# Patient Record
Sex: MALE | Race: WHITE | NOT HISPANIC OR LATINO | Employment: FULL TIME | URBAN - METROPOLITAN AREA
[De-identification: names, ages, dates, MRNs, and addresses within clinical notes are randomized per-mention and may not be internally consistent; named-entity substitution may affect disease eponyms.]

---

## 2021-02-02 ENCOUNTER — APPOINTMENT (OUTPATIENT)
Dept: RADIOLOGY | Facility: MEDICAL CENTER | Age: 31
End: 2021-02-02
Attending: EMERGENCY MEDICINE
Payer: COMMERCIAL

## 2021-02-02 ENCOUNTER — HOSPITAL ENCOUNTER (OUTPATIENT)
Facility: MEDICAL CENTER | Age: 31
End: 2021-02-03
Attending: EMERGENCY MEDICINE | Admitting: HOSPITALIST
Payer: COMMERCIAL

## 2021-02-02 ENCOUNTER — APPOINTMENT (OUTPATIENT)
Dept: RADIOLOGY | Facility: MEDICAL CENTER | Age: 31
End: 2021-02-02
Attending: HOSPITALIST
Payer: COMMERCIAL

## 2021-02-02 DIAGNOSIS — R26.2 INABILITY TO AMBULATE DUE TO MULTIPLE JOINTS: ICD-10-CM

## 2021-02-02 DIAGNOSIS — M54.6 ACUTE BILATERAL THORACIC BACK PAIN: ICD-10-CM

## 2021-02-02 DIAGNOSIS — R10.13 ACUTE EPIGASTRIC PAIN: ICD-10-CM

## 2021-02-02 DIAGNOSIS — I10 ESSENTIAL HYPERTENSION: ICD-10-CM

## 2021-02-02 DIAGNOSIS — R07.81 PLEURITIC CHEST PAIN: ICD-10-CM

## 2021-02-02 DIAGNOSIS — R40.4 TRANSIENT ALTERATION OF AWARENESS: ICD-10-CM

## 2021-02-02 DIAGNOSIS — E86.0 DEHYDRATION: ICD-10-CM

## 2021-02-02 PROBLEM — R73.09 ELEVATED GLUCOSE: Status: ACTIVE | Noted: 2021-02-02

## 2021-02-02 PROBLEM — E87.20 LACTIC ACIDOSIS: Status: ACTIVE | Noted: 2021-02-02

## 2021-02-02 PROBLEM — M54.9 PAIN, UPPER BACK: Status: ACTIVE | Noted: 2021-02-02

## 2021-02-02 LAB
ALBUMIN SERPL BCP-MCNC: 4.6 G/DL (ref 3.2–4.9)
ALBUMIN/GLOB SERPL: 1.7 G/DL
ALP SERPL-CCNC: 78 U/L (ref 30–99)
ALT SERPL-CCNC: 52 U/L (ref 2–50)
ANION GAP SERPL CALC-SCNC: 14 MMOL/L (ref 7–16)
APPEARANCE UR: CLEAR
APTT PPP: 28.1 SEC (ref 24.7–36)
AST SERPL-CCNC: 42 U/L (ref 12–45)
BASOPHILS # BLD AUTO: 1.2 % (ref 0–1.8)
BASOPHILS # BLD: 0.05 K/UL (ref 0–0.12)
BILIRUB SERPL-MCNC: 0.5 MG/DL (ref 0.1–1.5)
BILIRUB UR QL STRIP.AUTO: NEGATIVE
BUN SERPL-MCNC: 10 MG/DL (ref 8–22)
CALCIUM SERPL-MCNC: 9.2 MG/DL (ref 8.4–10.2)
CHLORIDE SERPL-SCNC: 105 MMOL/L (ref 96–112)
CO2 SERPL-SCNC: 22 MMOL/L (ref 20–33)
COLOR UR: YELLOW
CREAT SERPL-MCNC: 0.89 MG/DL (ref 0.5–1.4)
EKG IMPRESSION: NORMAL
EOSINOPHIL # BLD AUTO: 0.1 K/UL (ref 0–0.51)
EOSINOPHIL NFR BLD: 2.5 % (ref 0–6.9)
ERYTHROCYTE [DISTWIDTH] IN BLOOD BY AUTOMATED COUNT: 39.8 FL (ref 35.9–50)
GLOBULIN SER CALC-MCNC: 2.7 G/DL (ref 1.9–3.5)
GLUCOSE SERPL-MCNC: 100 MG/DL (ref 65–99)
GLUCOSE UR STRIP.AUTO-MCNC: NEGATIVE MG/DL
HCT VFR BLD AUTO: 46.1 % (ref 42–52)
HGB BLD-MCNC: 16.3 G/DL (ref 14–18)
IMM GRANULOCYTES # BLD AUTO: 0.01 K/UL (ref 0–0.11)
IMM GRANULOCYTES NFR BLD AUTO: 0.2 % (ref 0–0.9)
INR PPP: 0.92 (ref 0.87–1.13)
KETONES UR STRIP.AUTO-MCNC: NEGATIVE MG/DL
LACTATE BLD-SCNC: 2.2 MMOL/L (ref 0.5–2)
LEUKOCYTE ESTERASE UR QL STRIP.AUTO: NEGATIVE
LIPASE SERPL-CCNC: 31 U/L (ref 7–58)
LYMPHOCYTES # BLD AUTO: 1.97 K/UL (ref 1–4.8)
LYMPHOCYTES NFR BLD: 48.6 % (ref 22–41)
MCH RBC QN AUTO: 33.6 PG (ref 27–33)
MCHC RBC AUTO-ENTMCNC: 35.4 G/DL (ref 33.7–35.3)
MCV RBC AUTO: 95.1 FL (ref 81.4–97.8)
MICRO URNS: NORMAL
MONOCYTES # BLD AUTO: 0.45 K/UL (ref 0–0.85)
MONOCYTES NFR BLD AUTO: 11.1 % (ref 0–13.4)
NEUTROPHILS # BLD AUTO: 1.47 K/UL (ref 1.82–7.42)
NEUTROPHILS NFR BLD: 36.4 % (ref 44–72)
NITRITE UR QL STRIP.AUTO: NEGATIVE
NRBC # BLD AUTO: 0 K/UL
NRBC BLD-RTO: 0 /100 WBC
NT-PROBNP SERPL IA-MCNC: <5 PG/ML (ref 0–125)
PH UR STRIP.AUTO: 6 [PH] (ref 5–8)
PLATELET # BLD AUTO: 214 K/UL (ref 164–446)
PMV BLD AUTO: 8.7 FL (ref 9–12.9)
POTASSIUM SERPL-SCNC: 4.1 MMOL/L (ref 3.6–5.5)
PROT SERPL-MCNC: 7.3 G/DL (ref 6–8.2)
PROT UR QL STRIP: NEGATIVE MG/DL
PROTHROMBIN TIME: 12.1 SEC (ref 12–14.6)
RBC # BLD AUTO: 4.85 M/UL (ref 4.7–6.1)
RBC UR QL AUTO: NEGATIVE
SODIUM SERPL-SCNC: 141 MMOL/L (ref 135–145)
SP GR UR STRIP.AUTO: 1.01
TROPONIN T SERPL-MCNC: <6 NG/L (ref 6–19)
WBC # BLD AUTO: 4.1 K/UL (ref 4.8–10.8)

## 2021-02-02 PROCEDURE — 700102 HCHG RX REV CODE 250 W/ 637 OVERRIDE(OP): Performed by: EMERGENCY MEDICINE

## 2021-02-02 PROCEDURE — 85025 COMPLETE CBC W/AUTO DIFF WBC: CPT

## 2021-02-02 PROCEDURE — 81003 URINALYSIS AUTO W/O SCOPE: CPT

## 2021-02-02 PROCEDURE — 700105 HCHG RX REV CODE 258: Performed by: EMERGENCY MEDICINE

## 2021-02-02 PROCEDURE — 80053 COMPREHEN METABOLIC PANEL: CPT

## 2021-02-02 PROCEDURE — 83605 ASSAY OF LACTIC ACID: CPT

## 2021-02-02 PROCEDURE — 93005 ELECTROCARDIOGRAM TRACING: CPT | Performed by: EMERGENCY MEDICINE

## 2021-02-02 PROCEDURE — 71275 CT ANGIOGRAPHY CHEST: CPT

## 2021-02-02 PROCEDURE — 700111 HCHG RX REV CODE 636 W/ 250 OVERRIDE (IP): Performed by: STUDENT IN AN ORGANIZED HEALTH CARE EDUCATION/TRAINING PROGRAM

## 2021-02-02 PROCEDURE — 85730 THROMBOPLASTIN TIME PARTIAL: CPT

## 2021-02-02 PROCEDURE — G0378 HOSPITAL OBSERVATION PER HR: HCPCS

## 2021-02-02 PROCEDURE — 700117 HCHG RX CONTRAST REV CODE 255: Performed by: HOSPITALIST

## 2021-02-02 PROCEDURE — A9270 NON-COVERED ITEM OR SERVICE: HCPCS | Performed by: EMERGENCY MEDICINE

## 2021-02-02 PROCEDURE — A9576 INJ PROHANCE MULTIPACK: HCPCS | Performed by: HOSPITALIST

## 2021-02-02 PROCEDURE — 72128 CT CHEST SPINE W/O DYE: CPT

## 2021-02-02 PROCEDURE — 700111 HCHG RX REV CODE 636 W/ 250 OVERRIDE (IP): Performed by: EMERGENCY MEDICINE

## 2021-02-02 PROCEDURE — 96376 TX/PRO/DX INJ SAME DRUG ADON: CPT

## 2021-02-02 PROCEDURE — 72157 MRI CHEST SPINE W/O & W/DYE: CPT

## 2021-02-02 PROCEDURE — 96374 THER/PROPH/DIAG INJ IV PUSH: CPT

## 2021-02-02 PROCEDURE — 700101 HCHG RX REV CODE 250: Performed by: STUDENT IN AN ORGANIZED HEALTH CARE EDUCATION/TRAINING PROGRAM

## 2021-02-02 PROCEDURE — 36415 COLL VENOUS BLD VENIPUNCTURE: CPT

## 2021-02-02 PROCEDURE — 70450 CT HEAD/BRAIN W/O DYE: CPT

## 2021-02-02 PROCEDURE — 84484 ASSAY OF TROPONIN QUANT: CPT

## 2021-02-02 PROCEDURE — 700117 HCHG RX CONTRAST REV CODE 255: Performed by: EMERGENCY MEDICINE

## 2021-02-02 PROCEDURE — 83880 ASSAY OF NATRIURETIC PEPTIDE: CPT

## 2021-02-02 PROCEDURE — 83690 ASSAY OF LIPASE: CPT

## 2021-02-02 PROCEDURE — 99285 EMERGENCY DEPT VISIT HI MDM: CPT

## 2021-02-02 PROCEDURE — 99218 PR INITIAL OBSERVATION CARE,LEVL I: CPT | Performed by: HOSPITALIST

## 2021-02-02 PROCEDURE — 85610 PROTHROMBIN TIME: CPT

## 2021-02-02 RX ORDER — POLYETHYLENE GLYCOL 3350 17 G/17G
1 POWDER, FOR SOLUTION ORAL
Status: DISCONTINUED | OUTPATIENT
Start: 2021-02-02 | End: 2021-02-03 | Stop reason: HOSPADM

## 2021-02-02 RX ORDER — ONDANSETRON 2 MG/ML
4 INJECTION INTRAMUSCULAR; INTRAVENOUS EVERY 4 HOURS PRN
Status: DISCONTINUED | OUTPATIENT
Start: 2021-02-02 | End: 2021-02-03 | Stop reason: HOSPADM

## 2021-02-02 RX ORDER — CYCLOBENZAPRINE HCL 10 MG
10 TABLET ORAL ONCE
Status: COMPLETED | OUTPATIENT
Start: 2021-02-02 | End: 2021-02-02

## 2021-02-02 RX ORDER — KETOROLAC TROMETHAMINE 30 MG/ML
15 INJECTION, SOLUTION INTRAMUSCULAR; INTRAVENOUS ONCE
Status: COMPLETED | OUTPATIENT
Start: 2021-02-02 | End: 2021-02-02

## 2021-02-02 RX ORDER — LIDOCAINE 50 MG/G
1 PATCH TOPICAL EVERY 24 HOURS
Status: DISCONTINUED | OUTPATIENT
Start: 2021-02-02 | End: 2021-02-03 | Stop reason: HOSPADM

## 2021-02-02 RX ORDER — AMOXICILLIN 250 MG
2 CAPSULE ORAL 2 TIMES DAILY
Status: DISCONTINUED | OUTPATIENT
Start: 2021-02-02 | End: 2021-02-03 | Stop reason: HOSPADM

## 2021-02-02 RX ORDER — ONDANSETRON 4 MG/1
4 TABLET, ORALLY DISINTEGRATING ORAL EVERY 4 HOURS PRN
Status: DISCONTINUED | OUTPATIENT
Start: 2021-02-02 | End: 2021-02-03 | Stop reason: HOSPADM

## 2021-02-02 RX ORDER — HYDROMORPHONE HYDROCHLORIDE 1 MG/ML
1 INJECTION, SOLUTION INTRAMUSCULAR; INTRAVENOUS; SUBCUTANEOUS ONCE
Status: ACTIVE | OUTPATIENT
Start: 2021-02-02 | End: 2021-02-03

## 2021-02-02 RX ORDER — PROMETHAZINE HYDROCHLORIDE 25 MG/1
12.5-25 SUPPOSITORY RECTAL EVERY 4 HOURS PRN
Status: DISCONTINUED | OUTPATIENT
Start: 2021-02-02 | End: 2021-02-03 | Stop reason: HOSPADM

## 2021-02-02 RX ORDER — MORPHINE SULFATE 4 MG/ML
2 INJECTION, SOLUTION INTRAMUSCULAR; INTRAVENOUS
Status: DISCONTINUED | OUTPATIENT
Start: 2021-02-02 | End: 2021-02-02

## 2021-02-02 RX ORDER — BISACODYL 10 MG
10 SUPPOSITORY, RECTAL RECTAL
Status: DISCONTINUED | OUTPATIENT
Start: 2021-02-02 | End: 2021-02-03 | Stop reason: HOSPADM

## 2021-02-02 RX ORDER — KETOROLAC TROMETHAMINE 30 MG/ML
30 INJECTION, SOLUTION INTRAMUSCULAR; INTRAVENOUS EVERY 6 HOURS PRN
Status: COMPLETED | OUTPATIENT
Start: 2021-02-02 | End: 2021-02-03

## 2021-02-02 RX ORDER — PROMETHAZINE HYDROCHLORIDE 25 MG/1
12.5-25 TABLET ORAL EVERY 4 HOURS PRN
Status: DISCONTINUED | OUTPATIENT
Start: 2021-02-02 | End: 2021-02-03 | Stop reason: HOSPADM

## 2021-02-02 RX ORDER — SODIUM CHLORIDE 9 MG/ML
1000 INJECTION, SOLUTION INTRAVENOUS ONCE
Status: COMPLETED | OUTPATIENT
Start: 2021-02-02 | End: 2021-02-02

## 2021-02-02 RX ORDER — ONDANSETRON 2 MG/ML
4 INJECTION INTRAMUSCULAR; INTRAVENOUS ONCE
Status: DISCONTINUED | OUTPATIENT
Start: 2021-02-02 | End: 2021-02-02

## 2021-02-02 RX ORDER — PROCHLORPERAZINE EDISYLATE 5 MG/ML
5-10 INJECTION INTRAMUSCULAR; INTRAVENOUS EVERY 4 HOURS PRN
Status: DISCONTINUED | OUTPATIENT
Start: 2021-02-02 | End: 2021-02-03 | Stop reason: HOSPADM

## 2021-02-02 RX ORDER — ACETAMINOPHEN 500 MG
1000 TABLET ORAL EVERY 6 HOURS PRN
COMMUNITY
End: 2023-04-24

## 2021-02-02 RX ORDER — ACETAMINOPHEN 325 MG/1
650 TABLET ORAL EVERY 6 HOURS PRN
Status: DISCONTINUED | OUTPATIENT
Start: 2021-02-02 | End: 2021-02-03 | Stop reason: HOSPADM

## 2021-02-02 RX ADMIN — IOHEXOL 100 ML: 350 INJECTION, SOLUTION INTRAVENOUS at 02:32

## 2021-02-02 RX ADMIN — CYCLOBENZAPRINE 10 MG: 10 TABLET, FILM COATED ORAL at 03:28

## 2021-02-02 RX ADMIN — LIDOCAINE 1 PATCH: 50 PATCH TOPICAL at 12:46

## 2021-02-02 RX ADMIN — KETOROLAC TROMETHAMINE 30 MG: 30 INJECTION, SOLUTION INTRAMUSCULAR; INTRAVENOUS at 12:42

## 2021-02-02 RX ADMIN — KETOROLAC TROMETHAMINE 15 MG: 30 INJECTION, SOLUTION INTRAMUSCULAR at 03:27

## 2021-02-02 RX ADMIN — GADOTERIDOL 20 ML: 279.3 INJECTION, SOLUTION INTRAVENOUS at 08:39

## 2021-02-02 RX ADMIN — SODIUM CHLORIDE 1000 ML: 9 INJECTION, SOLUTION INTRAVENOUS at 01:56

## 2021-02-02 SDOH — HEALTH STABILITY: MENTAL HEALTH: HOW OFTEN DO YOU HAVE A DRINK CONTAINING ALCOHOL?: 4 OR MORE TIMES A WEEK

## 2021-02-02 ASSESSMENT — ENCOUNTER SYMPTOMS
NECK PAIN: 0
WEAKNESS: 0
PALPITATIONS: 0
BACK PAIN: 1
INSOMNIA: 0
BRUISES/BLEEDS EASILY: 0
COUGH: 0
SHORTNESS OF BREATH: 0
SORE THROAT: 0
FEVER: 0
BLURRED VISION: 0
MYALGIAS: 0
HEADACHES: 0
VOMITING: 0
DIZZINESS: 0
DOUBLE VISION: 0
DEPRESSION: 0
NAUSEA: 0

## 2021-02-02 ASSESSMENT — COGNITIVE AND FUNCTIONAL STATUS - GENERAL
MOVING TO AND FROM BED TO CHAIR: A LITTLE
CLIMB 3 TO 5 STEPS WITH RAILING: A LITTLE
SUGGESTED CMS G CODE MODIFIER DAILY ACTIVITY: CI
SUGGESTED CMS G CODE MODIFIER MOBILITY: CK
WALKING IN HOSPITAL ROOM: A LITTLE
MOBILITY SCORE: 18
TURNING FROM BACK TO SIDE WHILE IN FLAT BAD: A LITTLE
MOVING FROM LYING ON BACK TO SITTING ON SIDE OF FLAT BED: A LITTLE
STANDING UP FROM CHAIR USING ARMS: A LITTLE
DAILY ACTIVITIY SCORE: 23
TOILETING: A LITTLE

## 2021-02-02 ASSESSMENT — PAIN DESCRIPTION - PAIN TYPE
TYPE: ACUTE PAIN

## 2021-02-02 ASSESSMENT — LIFESTYLE VARIABLES
HAVE YOU EVER FELT YOU SHOULD CUT DOWN ON YOUR DRINKING: NO
TOTAL SCORE: 0
ALCOHOL_USE: YES
TOTAL SCORE: 0
ON A TYPICAL DAY WHEN YOU DRINK ALCOHOL HOW MANY DRINKS DO YOU HAVE: 2
EVER FELT BAD OR GUILTY ABOUT YOUR DRINKING: NO
CONSUMPTION TOTAL: NEGATIVE
HOW MANY TIMES IN THE PAST YEAR HAVE YOU HAD 5 OR MORE DRINKS IN A DAY: 0
EVER HAD A DRINK FIRST THING IN THE MORNING TO STEADY YOUR NERVES TO GET RID OF A HANGOVER: NO
AVERAGE NUMBER OF DAYS PER WEEK YOU HAVE A DRINK CONTAINING ALCOHOL: 7
HAVE PEOPLE ANNOYED YOU BY CRITICIZING YOUR DRINKING: NO
TOTAL SCORE: 0

## 2021-02-02 ASSESSMENT — FIBROSIS 4 INDEX: FIB4 SCORE: 0.87

## 2021-02-02 ASSESSMENT — PATIENT HEALTH QUESTIONNAIRE - PHQ9
1. LITTLE INTEREST OR PLEASURE IN DOING THINGS: NOT AT ALL
2. FEELING DOWN, DEPRESSED, IRRITABLE, OR HOPELESS: NOT AT ALL
SUM OF ALL RESPONSES TO PHQ9 QUESTIONS 1 AND 2: 0

## 2021-02-02 NOTE — ED TRIAGE NOTES
Pt comes in with wife  Started this after noon w/ severe upper back pain  Has Hx of disc issues  When to the store and back pain started   Making it difficult for pt to walk or move   Then later today around 5-6 wife to pt to friends and pt does not remember how they got there, how was there or why they were there   Pt states to RN he does drink alcohol to help him sleep after he gets off of work    Pt does not have numbness or tingling  No loss of bowel or bladder

## 2021-02-02 NOTE — CARE PLAN
Problem: Pain Management  Goal: Pain level will decrease to patient's comfort goal  Outcome: PROGRESSING SLOWER THAN EXPECTED   Patient states his pain is 4/10 without movement which is tolerable. Patient is refusing narcotic pain medication and stated that the toradol helped a little, MD updated for order. Will continue to monitor for pain.     Problem: Knowledge Deficit  Goal: Knowledge of disease process/condition, treatment plan, diagnostic tests, and medications will improve  Outcome: PROGRESSING AS EXPECTED   Discussed plan of care with patient including MRI of the spine ordered and pain management. Allowed time for questions, patient agreed and verbalized understanding.

## 2021-02-02 NOTE — ED PROVIDER NOTES
ED Provider Note    ED Provider Note    Scribed for Raf Lizarraga MD by Raf Lizarraga M.D.. 2/2/2021, 12:58 AM.    Primary care provider: No primary care provider on file.  Means of arrival: Private  History obtained from: Patient and spouse  History limited by: None    CHIEF COMPLAINT  Chief Complaint   Patient presents with   • Back Pain     upper back pain started today  was fine in morning  went to story when on set pain (around noon)     • Memory Loss     per wife around 5-6 tongiht pt had a difficult time remembering where he was, who was there and how he got there        HPI  Adria Chacon is a 30 y.o. male who presents to the Emergency Department for evaluation of acute back pain.  Patient relates while he was shopping today, he notes he would not do any significant heavy lifting, only 2 small grocery bags with began to have initially milder than more severe pain to the upper mid back.  Patient localizes the pain essentially between the shoulder blades.  He does have a history of sciatica and lumbar degenerative disc disease but no known thoracic disease.  He notes the pain does radiate to the anterior abdomen and the bilateral costal margins.  Notes pain involves epigastrium as well.  Pain more so with deep respiration and any attempted movement especially bending his neck forward or backward.  He is never really had any similar such symptoms.  He took a gram of Tylenol prior to arrival no improvement.  Patient notes exquisite pain when he is attempting to move and is in obvious significant distress.  He notes pain does not seem to radiate down the legs, denies any bowel or bladder dysfunction, denies any involvement in the extremities, no numbness and no weakness.    Patient's wife also notes he seemed to have a difficult time acutely this evening remembering exactly where he was in the home and how he got in there.  He has no history of any cognitive issues, never really any similar  "such symptoms as this.  Patient is fully oriented at this time and is able to give a good history.  Denies any significant drug use but does occasionally use alcohol.  Is a former smoker, employed and otherwise healthy though he does not regularly follow with primary care provider he notes unremarkable work physicals on a yearly basis.    REVIEW OF SYSTEMS  Pertinent positives include acute atraumatic upper/mid back pain with radiation to the epigastrium and the costal margins with painful movement and deep respiration. Pertinent negatives include no bowel or bladder dysfunction, no trauma, no vomiting, no fever.  All other systems reviewed and negative.    PAST MEDICAL HISTORY   Otherwise healthy, former tobacco use    SURGICAL HISTORY   has a past surgical history that includes other and appendectomy.    SOCIAL HISTORY  Social History     Tobacco Use   • Smoking status: Former Smoker     Years: 3.00   • Smokeless tobacco: Never Used   Substance Use Topics   • Alcohol use: Yes     Frequency: 4 or more times a week   • Drug use: Never      Social History     Substance and Sexual Activity   Drug Use Never       FAMILY HISTORY  History reviewed. No pertinent family history.  Noncontributory    CURRENT MEDICATIONS  Home Medications     Reviewed by Helen Alvarez R.N. (Registered Nurse) on 02/02/21 at 0027  Med List Status: <None>   Medication Last Dose Status        Patient Gilberto Taking any Medications                       ALLERGIES  Not on File    PHYSICAL EXAM  VITAL SIGNS: /89   Pulse 77   Temp 36.5 °C (97.7 °F) (Temporal)   Resp 16   Ht 1.803 m (5' 11\")   Wt 104 kg (230 lb)   SpO2 94%   BMI 32.08 kg/m²     General: Alert, moderate acute distress, appears significantly uncomfortable.  Skin: Warm, dry, mildly pale  Head: Normocephalic, atraumatic  Neck: Trachea midline, no tenderness  Eye: PERRL, normal conjunctiva  ENMT: Oral mucosa moist, no pharyngeal erythema or exudate  Cardiovascular: Regular " rate and rhythm, No murmur, Normal peripheral perfusion  Respiratory: Lungs CTA, respirations are non-labored, breath sounds are equal  Gastrointestinal: Soft, tenderness the bilateral upper quadrant of the epigastrium, no guarding, no rebound, no rigidity.  Musculoskeletal: No swelling, no deformity.  Reproducible tenderness to both costal margins without any step-off.  Exquisite tenderness to the midline and paraspinous regions of the upper thoracic spine approximating T3 2 through T10.  Neurological: Alert and oriented to person, place, time, and situation.  5 x 5 dorsal plantarflexion of the feet and toes, no foot drop, no saddle anesthesia.  Lymphatics: No lymphadenopathy  Psychiatric: Cooperative, appropriate mood & affect      DIAGNOSTIC STUDIES/PROCEDURES    LABS  Results for orders placed or performed during the hospital encounter of 02/02/21   CBC w/ Differential   Result Value Ref Range    WBC 4.1 (L) 4.8 - 10.8 K/uL    RBC 4.85 4.70 - 6.10 M/uL    Hemoglobin 16.3 14.0 - 18.0 g/dL    Hematocrit 46.1 42.0 - 52.0 %    MCV 95.1 81.4 - 97.8 fL    MCH 33.6 (H) 27.0 - 33.0 pg    MCHC 35.4 (H) 33.7 - 35.3 g/dL    RDW 39.8 35.9 - 50.0 fL    Platelet Count 214 164 - 446 K/uL    MPV 8.7 (L) 9.0 - 12.9 fL    Neutrophils-Polys 36.40 (L) 44.00 - 72.00 %    Lymphocytes 48.60 (H) 22.00 - 41.00 %    Monocytes 11.10 0.00 - 13.40 %    Eosinophils 2.50 0.00 - 6.90 %    Basophils 1.20 0.00 - 1.80 %    Immature Granulocytes 0.20 0.00 - 0.90 %    Nucleated RBC 0.00 /100 WBC    Neutrophils (Absolute) 1.47 (L) 1.82 - 7.42 K/uL    Lymphs (Absolute) 1.97 1.00 - 4.80 K/uL    Monos (Absolute) 0.45 0.00 - 0.85 K/uL    Eos (Absolute) 0.10 0.00 - 0.51 K/uL    Baso (Absolute) 0.05 0.00 - 0.12 K/uL    Immature Granulocytes (abs) 0.01 0.00 - 0.11 K/uL    NRBC (Absolute) 0.00 K/uL   Complete Metabolic Panel (CMP)   Result Value Ref Range    Sodium 141 135 - 145 mmol/L    Potassium 4.1 3.6 - 5.5 mmol/L    Chloride 105 96 - 112 mmol/L    Co2  22 20 - 33 mmol/L    Anion Gap 14.0 7.0 - 16.0    Glucose 100 (H) 65 - 99 mg/dL    Bun 10 8 - 22 mg/dL    Creatinine 0.89 0.50 - 1.40 mg/dL    Calcium 9.2 8.4 - 10.2 mg/dL    AST(SGOT) 42 12 - 45 U/L    ALT(SGPT) 52 (H) 2 - 50 U/L    Alkaline Phosphatase 78 30 - 99 U/L    Total Bilirubin 0.5 0.1 - 1.5 mg/dL    Albumin 4.6 3.2 - 4.9 g/dL    Total Protein 7.3 6.0 - 8.2 g/dL    Globulin 2.7 1.9 - 3.5 g/dL    A-G Ratio 1.7 g/dL   Lipase   Result Value Ref Range    Lipase 31 7 - 58 U/L   Troponin STAT   Result Value Ref Range    Troponin T <6 6 - 19 ng/L   PT/INR   Result Value Ref Range    PT 12.1 12.0 - 14.6 sec    INR 0.92 0.87 - 1.13   APTT   Result Value Ref Range    APTT 28.1 24.7 - 36.0 sec   LACTIC ACID   Result Value Ref Range    Lactic Acid 2.2 (H) 0.5 - 2.0 mmol/L   proBrain Natriuretic Peptide, NT   Result Value Ref Range    NT-proBNP <5 0 - 125 pg/mL   ESTIMATED GFR   Result Value Ref Range    GFR If African American >60 >60 mL/min/1.73 m 2    GFR If Non African American >60 >60 mL/min/1.73 m 2   URINALYSIS    Specimen: Urine   Result Value Ref Range    Color Yellow     Character Clear     Specific Gravity 1.015 <1.035    Ph 6.0 5.0 - 8.0    Glucose Negative Negative mg/dL    Ketones Negative Negative mg/dL    Protein Negative Negative mg/dL    Bilirubin Negative Negative    Nitrite Negative Negative    Leukocyte Esterase Negative Negative    Occult Blood Negative Negative    Micro Urine Req see below    EKG   Result Value Ref Range    Report       [facility] Emergency Dept.    Test Date:  2021  Pt Name:    KELY CHRISTIAN                   Department: EDS  MRN:        [id number]                      [facility]  Gender:     Male                         Technician: SAFIA  :        [id number]                   Requested By:FRANCES ROMAN  Order #:    [id number]                    Reading MD: FRANCES ROMAN MD    Measurements  Intervals                                 Axis  Rate:       83                           P:          49  ME:         160                          QRS:        -38  QRSD:       98                           T:          13  QT:         364  QTc:        428    Interpretive Statements  SINUS RHYTHM  LEFT AXIS DEVIATION  BASELINE WANDER IN LEAD(S) V3  No previous ECG available for comparison  Electronically Signed On 2-2-2021 3:52:36 PST by FRANCES ROMAN MD       All labs reviewed by me, mildly elevated lactic acid, leukocytes are low normal, lymphocytes predominate concerning for potential viral illness.    EKG  12 Lead EKG obtained at 0120 and interpreted by me to show:  Rhythm: Normal sinus rhythm   Rate: 83  Axis: Left  Intervals: Normal  Q Waves: Normal  No diagnostic ST segment elevation    Clinical Impression: Normal EKG  Compared to none available    RADIOLOGY  CT-CTA COMPLETE THORACOABDOMINAL AORTA   Final Result      No aortic aneurysm or dissection.      Hepatic steatosis.            CT-TSPINE W/O PLUS RECONS   Final Result      No acute abnormality.         MR-THORACIC SPINE-WITH & W/O    (Results Pending)   CT-HEAD W/O    (Results Pending)     The radiologist's interpretation of all radiological studies have been reviewed by me.    COURSE & MEDICAL DECISION MAKING  Pertinent Labs & Imaging studies reviewed. (See chart for details)    12:58 AM - Patient seen and examined at bedside. Patient will be treated with hydromorphone, Zofran, famotidine. Ordered cardiac work-up as well as lactic acid and lipase and CTA of the aorta and CT of the T-spine to evaluate his symptoms. The differential diagnoses include but are not limited to: Aortic dissection, pulmonary embolism, pancreatitis, pathologic fracture    0245: Patient reassessed and updated with labs thus far, fluids ordered given mildly elevated lactate, likely secondary to dehydration.    0325: Patient reassessed, he has declined Dilaudid, he still in significant pain.  Thankfully CT  "demonstrates no evidence of vascular catastrophe nor of any pathologic fracture to the T-spine.  At this point unclear etiology of patient's pain.  No evidence of pericardial effusion on CT.  I performed a bedside ultrasound which also is consistent with this finding.  Given this point no contraindication NSAIDs have ordered ketorolac 15 mg IV for the patient.  Unclear etiology of his initial confusion as well.  I have ordered a CT of the brain at this time, results are still pending.    HYDRATION: Based on the patient's presentation of Dehydration the patient was given IV fluids. IV Hydration was used because oral hydration was not adequate alone. Upon recheck following hydration, the patient was doing somewhat better, skin tone improved.    Patient Vitals for the past 24 hrs:   BP Temp Temp src Pulse Resp SpO2 Height Weight   02/02/21 0343 134/89 -- -- 77 16 94 % -- --   02/02/21 0338 -- -- -- -- 16 -- -- --   02/02/21 0337 141/94 -- -- 77 -- 94 % -- --   02/02/21 0250 140/92 -- -- 85 16 92 % -- --   02/02/21 0243 (!) 164/111 -- -- 79 18 92 % -- --   02/02/21 0143 157/108 -- -- 84 18 93 % -- --   02/02/21 0136 140/90 -- -- 90 18 99 % -- --   02/02/21 0113 156/112 -- -- 83 18 92 % -- --   02/02/21 0043 140/105 -- -- 96 -- 93 % -- --   02/02/21 0025 151/106 36.5 °C (97.7 °F) Temporal 94 18 95 % -- --   02/02/21 0019 -- -- -- -- -- -- 1.803 m (5' 11\") 104 kg (230 lb)         Decision Making:  This is a 30 y.o. year old male who presents with severe mid back pain with radiation to the upper abdomen and lower chest without any trauma.  He is hypertensive and in obvious significant discomfort.  Patient has serious pain when moving and does have reproducible tenderness to the back and upper abdomen.  Neurovascular intact and demonstrates no foot drop and no saddle anesthesia and he notes no bowel or bladder dysfunction.  In addition, he notes sensation of what sound like brief disorientation this evening.  Patient fully " oriented and cognitively appropriate though in obvious significant pain on my exam.  Given he is hypertensive and does not regularly follow with a primary care provider and given the significant atraumatic pain to high on the differential is vascular catastrophe including aortic dissection.  CT angiogram will be obtained.  Given there is also reproducible tenderness to the T-spine and significant pain with movement of ordered a CT of the T-spine as well to evaluate further.  Thankfully this is quite unremarkable, no evidence of vascular catastrophe.  No evidence of ACS, troponin and EKG are unremarkable with heart score of 3, low risk stratification.  However given the severity of the patient's pain to the extent he cannot ambulate I do feel he will benefit from inpatient management, spoke with hospitalist who concurs accepts the patient to his service, likely MRI in the morning to evaluate with regard to his severe back pain, CT of brain also will be obtained to evaluate further with regard to the complaint of brief confusion.    Patient admitted in improved but guarded condition to the care of the hospitalist Dr. Kingston.    FINAL IMPRESSION  1. Acute bilateral thoracic back pain    2. Acute epigastric pain    3. Pleuritic chest pain    4. Essential hypertension    5. Inability to ambulate due to multiple joints    6. Transient alteration of awareness    7. Dehydration          Raf VILLA M.D. (Scribe), am scribing for, and in the presence of, Raf Lizarraga MD.    Electronically signed by: Raf Lizarraga M.D. (Scribe), 2/2/2021    Raf VILLA MD personally performed the services described in this documentation, as scribed by Raf Lizarraga M.D. in my presence, and it is both accurate and complete    The note accurately reflects work and decisions made by me.  Raf Lizarraga M.D.  2/2/2021  4:42 AM

## 2021-02-02 NOTE — PROGRESS NOTES
Report received from Barbara MATA. Plan of care discussed. Patient resting comfortably in bed, respirations are even and unlabored. Safety precautions in place.
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
## 2021-02-02 NOTE — ASSESSMENT & PLAN NOTE
-Observation status on medical floor.  -Unclear etiology of back pain.  -Patient had CTA of the thoracic order that came back negative for acute findings.  -CT spine did not show any acute abnormalities.  -CT Head added pending results  -We will continue with as needed IV medications for pain control.  He is responding well to Toradol.  -Order MRI of the thoracic spine in the morning

## 2021-02-02 NOTE — H&P
Hospital Medicine History & Physical Note    Date of Service  2/2/2021    Primary Care Physician  No primary care provider on file.    Consultants  None    Code Status  Full Code    Chief Complaint  Chief Complaint   Patient presents with   • Back Pain     upper back pain started today  was fine in morning  went to story when on set pain (around noon)     • Memory Loss     per wife around 5-6 tongiht pt had a difficult time remembering where he was, who was there and how he got there        History of Presenting Illness  30 y.o. male, no significant PMH who is a  and  presented to the ER on 2/2/2021 with wife after developing acute onset of severe back pain while grabbing some bags at the market. Pain is mostly thoracic, between his shoulder and worse with minimal movement. There was also some concern for confusion.  Patient had extensive workup done in the ED, that is unremarkable including CTA Aorta and CT Spine. Laboratories also noncontributory. Since pain remains disprotions, ERP requested for admission, as this never happen prior.     Review of Systems  Review of Systems   Constitutional: Negative for fever.   HENT: Negative for congestion and sore throat.    Eyes: Negative for blurred vision and double vision.   Respiratory: Negative for cough and shortness of breath.    Cardiovascular: Negative for chest pain and palpitations.   Gastrointestinal: Negative for nausea and vomiting.   Genitourinary: Negative for dysuria and urgency.   Musculoskeletal: Positive for back pain. Negative for myalgias and neck pain.   Skin: Negative for itching and rash.   Neurological: Negative for dizziness, weakness and headaches.   Endo/Heme/Allergies: Does not bruise/bleed easily.   Psychiatric/Behavioral: Negative for depression. The patient does not have insomnia.        Past Medical History   has no past medical history on file.    Surgical History   has a past surgical history that includes other and  appendectomy.     Family History  Reviewed and not pertinent    Social History   reports that he has quit smoking. He quit after 3.00 years of use. He has never used smokeless tobacco. He reports current alcohol use. He reports that he does not use drugs.    Allergies  Not on File    Medications  None       Physical Exam  Temp:  [36.5 °C (97.7 °F)] 36.5 °C (97.7 °F)  Pulse:  [77-96] 77  Resp:  [16-18] 16  BP: (134-164)/() 134/89  SpO2:  [92 %-99 %] 94 %    Physical Exam  Constitutional:       Appearance: Normal appearance.   HENT:      Head: Normocephalic and atraumatic.      Nose: Nose normal.      Mouth/Throat:      Mouth: Mucous membranes are moist.      Pharynx: Oropharynx is clear.   Eyes:      Extraocular Movements: Extraocular movements intact.      Pupils: Pupils are equal, round, and reactive to light.   Neck:      Musculoskeletal: Normal range of motion and neck supple.   Cardiovascular:      Rate and Rhythm: Normal rate and regular rhythm.      Pulses: Normal pulses.      Heart sounds: Normal heart sounds.   Pulmonary:      Effort: Pulmonary effort is normal.      Breath sounds: Normal breath sounds.   Abdominal:      General: Abdomen is flat. Bowel sounds are normal.      Palpations: Abdomen is soft.   Musculoskeletal:      Comments: Moderate pain and discomfort in his spine with minimal movements.   Skin:     General: Skin is warm and dry.   Neurological:      General: No focal deficit present.      Mental Status: He is alert and oriented to person, place, and time.   Psychiatric:         Mood and Affect: Mood normal.         Behavior: Behavior normal.         Laboratory:  Recent Labs     02/02/21  0120   WBC 4.1*   RBC 4.85   HEMOGLOBIN 16.3   HEMATOCRIT 46.1   MCV 95.1   MCH 33.6*   MCHC 35.4*   RDW 39.8   PLATELETCT 214   MPV 8.7*     Recent Labs     02/02/21  0120   SODIUM 141   POTASSIUM 4.1   CHLORIDE 105   CO2 22   GLUCOSE 100*   BUN 10   CREATININE 0.89   CALCIUM 9.2     Recent Labs      02/02/21  0120   ALTSGPT 52*   ASTSGOT 42   ALKPHOSPHAT 78   TBILIRUBIN 0.5   LIPASE 31   GLUCOSE 100*     Recent Labs     02/02/21  0120   APTT 28.1   INR 0.92     Recent Labs     02/02/21 0120   NTPROBNP <5         Recent Labs     02/02/21 0120   TROPONINT <6       Imaging:  CT-CTA COMPLETE THORACOABDOMINAL AORTA   Final Result      No aortic aneurysm or dissection.      Hepatic steatosis.            CT-TSPINE W/O PLUS RECONS   Final Result      No acute abnormality.           EKG: NSR @ 83 bpm, Left axis deviation. No acute ST elevatios.     Assessment/Plan:  I anticipate this patient is appropriate for observation status at this time.    Lactic acidosis  Assessment & Plan  -Lactic is 2.2.  Is likely reactive, monitor morning labs.    Pain, upper back  Assessment & Plan  -Observation status on medical floor.  -Unclear etiology of back pain.  -Patient had CTA of the thoracic order that came back negative for acute findings.  -CT spine did not show any acute abnormalities.  -CT Head added pending results  -We will continue with as needed IV medications for pain control.  He is responding well to Toradol.  -Order MRI of the thoracic spine in the morning    Elevated glucose  Assessment & Plan  -Elevated to 200.  Likely reactive, monitor morning labs.      DVT Ppx: SCD's

## 2021-02-02 NOTE — PROGRESS NOTES
"Assessment completed, patient is alert and oriented x 4, patient states his back pain is 4/10 without moving but it increases with movement, offered patient pain medication but patient requested a non narcotic to be ordered, offered patient tylenol, patient refused at this time. MD updated of patient requesting toradol as it \"helped a little.\" Urinal provided, safety precautions in place.   "

## 2021-02-02 NOTE — ED NOTES
Med rec updated and complete  Allergies reviewed  Interviewed pt with mother at bedside with permission from pt.  Pt reports no prescription medications or vitamins   Pt reports no antibiotics in the last 2 weeks

## 2021-02-02 NOTE — PROGRESS NOTES
Patient was admitted earlier this morning.    Briefly, 30 y.o. male, no significant PMH who is a  and  presented to the ER on 2/2/2021 with wife after developing acute onset of severe back pain while [place] some bags at the market. Pain is mostly thoracic, between his shoulder and worse with minimal movement. There was also some concern for confusion.  Patient had extensive workup done in the ED, that is unremarkable including CTA Aorta, CT head and CT Spine.     Patient underwent MRI thoracic showing small central disc protrusion at T6-7 without significant spinal or neural foraminal stenosis.     Currently patient reports his back pain is 4/10 while resting, which increases to 8-9/10 with minimal movements. No numbness and tingling in BLE and BUE. Strength intact.     Plans  1. Pain control with Toradol 30mg q6h prn for total 3 doses; lidocaine patch  2. PT evaluation  3. Given his severe back pain with movements, I consult neurosurgery for evaluation.

## 2021-02-03 VITALS
DIASTOLIC BLOOD PRESSURE: 100 MMHG | RESPIRATION RATE: 16 BRPM | TEMPERATURE: 98.2 F | HEIGHT: 71 IN | OXYGEN SATURATION: 94 % | BODY MASS INDEX: 34.72 KG/M2 | SYSTOLIC BLOOD PRESSURE: 154 MMHG | HEART RATE: 78 BPM | WEIGHT: 248 LBS

## 2021-02-03 PROBLEM — R73.09 ELEVATED GLUCOSE: Status: RESOLVED | Noted: 2021-02-02 | Resolved: 2021-02-03

## 2021-02-03 PROBLEM — E87.20 LACTIC ACIDOSIS: Status: RESOLVED | Noted: 2021-02-02 | Resolved: 2021-02-03

## 2021-02-03 PROBLEM — M51.24 PROTRUSION OF THORACIC INTERVERTEBRAL DISC: Status: ACTIVE | Noted: 2021-02-03

## 2021-02-03 LAB
ANION GAP SERPL CALC-SCNC: 15 MMOL/L (ref 7–16)
BUN SERPL-MCNC: 17 MG/DL (ref 8–22)
CALCIUM SERPL-MCNC: 9.6 MG/DL (ref 8.4–10.2)
CHLORIDE SERPL-SCNC: 102 MMOL/L (ref 96–112)
CO2 SERPL-SCNC: 23 MMOL/L (ref 20–33)
CREAT SERPL-MCNC: 0.98 MG/DL (ref 0.5–1.4)
ERYTHROCYTE [DISTWIDTH] IN BLOOD BY AUTOMATED COUNT: 40.7 FL (ref 35.9–50)
GLUCOSE SERPL-MCNC: 88 MG/DL (ref 65–99)
HCT VFR BLD AUTO: 46.6 % (ref 42–52)
HGB BLD-MCNC: 16.1 G/DL (ref 14–18)
MCH RBC QN AUTO: 33.3 PG (ref 27–33)
MCHC RBC AUTO-ENTMCNC: 34.5 G/DL (ref 33.7–35.3)
MCV RBC AUTO: 96.5 FL (ref 81.4–97.8)
PLATELET # BLD AUTO: 199 K/UL (ref 164–446)
PMV BLD AUTO: 9.1 FL (ref 9–12.9)
POTASSIUM SERPL-SCNC: 3.7 MMOL/L (ref 3.6–5.5)
RBC # BLD AUTO: 4.83 M/UL (ref 4.7–6.1)
SODIUM SERPL-SCNC: 140 MMOL/L (ref 135–145)
WBC # BLD AUTO: 4.9 K/UL (ref 4.8–10.8)

## 2021-02-03 PROCEDURE — G0378 HOSPITAL OBSERVATION PER HR: HCPCS

## 2021-02-03 PROCEDURE — 97162 PT EVAL MOD COMPLEX 30 MIN: CPT

## 2021-02-03 PROCEDURE — 700111 HCHG RX REV CODE 636 W/ 250 OVERRIDE (IP): Performed by: STUDENT IN AN ORGANIZED HEALTH CARE EDUCATION/TRAINING PROGRAM

## 2021-02-03 PROCEDURE — 85027 COMPLETE CBC AUTOMATED: CPT

## 2021-02-03 PROCEDURE — 99217 PR OBSERVATION CARE DISCHARGE: CPT | Performed by: STUDENT IN AN ORGANIZED HEALTH CARE EDUCATION/TRAINING PROGRAM

## 2021-02-03 PROCEDURE — 96376 TX/PRO/DX INJ SAME DRUG ADON: CPT

## 2021-02-03 PROCEDURE — 80048 BASIC METABOLIC PNL TOTAL CA: CPT

## 2021-02-03 RX ADMIN — KETOROLAC TROMETHAMINE 30 MG: 30 INJECTION, SOLUTION INTRAMUSCULAR; INTRAVENOUS at 00:14

## 2021-02-03 RX ADMIN — KETOROLAC TROMETHAMINE 30 MG: 30 INJECTION, SOLUTION INTRAMUSCULAR; INTRAVENOUS at 10:11

## 2021-02-03 ASSESSMENT — COGNITIVE AND FUNCTIONAL STATUS - GENERAL
SUGGESTED CMS G CODE MODIFIER MOBILITY: CH
MOBILITY SCORE: 24

## 2021-02-03 ASSESSMENT — GAIT ASSESSMENTS: GAIT LEVEL OF ASSIST: INDEPENDENT

## 2021-02-03 ASSESSMENT — PAIN DESCRIPTION - PAIN TYPE
TYPE: ACUTE PAIN
TYPE: ACUTE PAIN

## 2021-02-03 NOTE — PROGRESS NOTES
Rounded with Dr. Hsu at bedside. Patient to follow-up with neurosurgery outpatient and work with PT today. Patient can possibly discharge pending PT evaluation. Raul PT updated via [employer] of order, awaiting on response.

## 2021-02-03 NOTE — PROGRESS NOTES
Updated Dr. Hsu on PT recommendations. Awaiting on doctors note and outpatient therapy referral for discharge.

## 2021-02-03 NOTE — DISCHARGE SUMMARY
Discharge Summary    CHIEF COMPLAINT ON ADMISSION  Chief Complaint   Patient presents with   • Back Pain     upper back pain started today  was fine in morning  went to story when on set pain (around noon)     • Memory Loss     per wife around 5-6 tongiht pt had a difficult time remembering where he was, who was there and how he got there        Reason for Admission  BACK PAIN, LOSS OF MEMORY     Admission Date  2/2/2021    CODE STATUS  Full Code    HPI & HOSPITAL COURSE  30 y.o. male, no significant PMH who is a  and  presented to the ER on 2/2/2021 with wife after developing acute onset of severe back pain while grabbing some bags at the market. Pain is mostly thoracic, between his shoulder and worse with minimal movement. There was also some concern for confusion.  Patient had extensive workup done in the ED, that is unremarkable including CTA Aorta, CT head and CT Spine.      Patient underwent MRI thoracic showing small central disc protrusion at T6-7 without significant spinal or neural foraminal stenosis. Neurosurgery is consulted and rec outpatient follow up. PT evaluated patient and rec outpatient PT follow up.     Therefore, he is discharged in good and stable condition to home with close outpatient follow-up. He is advised to follow up neurosurgery and PCP.     The patient met 2-midnight criteria for an inpatient stay at the time of discharge.    Discharge Date  2/3/2021    FOLLOW UP ITEMS POST DISCHARGE  PCP  neurosurgery    DISCHARGE DIAGNOSES  Active Problems:    Pain, upper back POA: Unknown    Protrusion of thoracic intervertebral disc POA: Unknown  Resolved Problems:    Lactic acidosis POA: Unknown    Elevated glucose POA: Unknown      FOLLOW UP  No future appointments.  Yaniv Vasquez M.D.  [address]  [place] [address]  [phone]    In 2 weeks  disc protrusion    pcp    In 1 week        MEDICATIONS ON DISCHARGE     Medication List      CONTINUE taking these medications       Instructions   acetaminophen 500 MG Tabs  Commonly known as: TYLENOL   Take 1,000 mg by mouth every 6 hours as needed for Moderate Pain.  Dose: 1,000 mg            Allergies  Allergies   Allergen Reactions   • Raspberry Anaphylaxis   • Diprivan      Pt reports that he was asleep for 2 hours, not able to wake up       DIET  Orders Placed This Encounter   Procedures   • Diet Order Diet: Regular     Standing Status:   Standing     Number of Occurrences:   1     Order Specific Question:   Diet:     Answer:   Regular [1]       ACTIVITY  As tolerated.  Weight bearing as tolerated    CONSULTATIONS  Neurosurgery    PROCEDURES  None    LABORATORY  Lab Results   Component Value Date    SODIUM 140 02/03/2021    POTASSIUM 3.7 02/03/2021    CHLORIDE 102 02/03/2021    CO2 23 02/03/2021    GLUCOSE 88 02/03/2021    BUN 17 02/03/2021    CREATININE 0.98 02/03/2021        Lab Results   Component Value Date    WBC 4.9 02/03/2021    HEMOGLOBIN 16.1 02/03/2021    HEMATOCRIT 46.6 02/03/2021    PLATELETCT 199 02/03/2021        Total time of the discharge process exceeds 40 minutes.

## 2021-02-03 NOTE — THERAPY
Physical Therapy   Initial Evaluation     Patient Name: Adria Chacon  Age:  30 y.o., Sex:  male  Medical Record #: [id number]  Today's Date: 2/3/2021          Assessment  Patient is 30 y.o. male with a diagnosis of Thorasic pain Pt lives at home with significant other and is active.Pt was instructed in Rib and thorasic mobs,posture and strengthening excs for thorasic spine      Plan    Recommend Physical Therapy for Evaluation only      02/03/21 1011   Pain 0 - 10 Group   Location Back;Rib Cage   Location Orientation Upper;Right   Pain Rating Scale (NPRS) 4   Description Sharp   Comfort Goal 4   Prior Living Situation   Prior Services None   Equipment Owned None   Lives with - Patient's Self Care Capacity Significant Other   Prior Level of Functional Mobility   Bed Mobility Independent   Transfer Status Independent   Ambulation Independent   Distance Ambulation (Feet)   (community amb)   Assistive Devices Used None   Stairs Independent   Balance Assessment   Sitting Balance (Static) Good   Sitting Balance (Dynamic) Good   Standing Balance (Static) Good   Standing Balance (Dynamic) Good   Gait Analysis   Gait Level Of Assist Independent   Assistive Device None   Weight Bearing Status full   Bed Mobility    Supine to Sit Modified Independent   Sit to Supine Modified Independent   Scooting Modified Independent   Functional Mobility   Sit to Stand Supervised   Bed, Chair, Wheelchair Transfer Supervised   Transfer Method Stand Step   Activity Tolerance   Standing 5   Patient / Family Goals    Patient / Family Goal #1 Home   Education Group   Education Provided Exercises - Seated;Exercises - Standing   Problem List    Problems None   Anticipated Discharge Equipment and Recommendations   DC Equipment Recommendations None   Discharge Recommendations Recommend outpatient physical therapy services to address higher level deficits       DC Equipment Recommendations: (P) None  Discharge Recommendations: (P) Recommend  outpatient physical therapy services to address higher level deficits

## 2021-02-03 NOTE — CARE PLAN
Problem: Knowledge Deficit  Goal: Knowledge of disease process/condition, treatment plan, diagnostic tests, and medications will improve  Outcome: PROGRESSING AS EXPECTED   Discussed plan of care with patient including pain management and neurosurgeon. Allowed time for questions, patient agreed and verbalized understanding.     Problem: Pain Management  Goal: Pain level will decrease to patient's comfort goal  Outcome: PROGRESSING AS EXPECTED   Patient stated that pain is more tolerable today but still present. Will continue to monitor.

## 2021-02-03 NOTE — PROGRESS NOTES
Pt discharged to home. Discharge instructions provided to pt. Pt verbalizes understanding. Pt states all questions have been answered. Signed copy in chart. Prescriptions sent to [facility]. Pt states that all personal belongings are in possession. Pt off unit via wheelchair, escorted by transport.

## 2021-02-03 NOTE — PROGRESS NOTES
Assessment completed, patient is alert and oriented x 4, patient states his back pain is 2/10 at this time and with movement it is 6/10. Patient stated that it is better than it has been. Patient refuses any pain medication at this time. Safety precautions in place.

## 2021-02-03 NOTE — DISCHARGE INSTRUCTIONS
Discharge Instructions per Suyapa Hsu M.D.    1. Please follow up with PT and take ibuprofen every 6h as needed for pain for 1 week. Please note, don't take ibuprofen too much or take it as long term because it may cause gastric ulcers or kidney injury  2. Please follow up with neurosurgery in 1-2 weeks  3. Please follow-up with PCP as outpatient in one week    DIET: regular    ACTIVITY: As tolerated    DIAGNOSIS: back pain due to disc protrusion at T6-7    Return to ER in the event of new or worsening symptoms. Please note importance of compliance and the patient has agreed to proceed with all medical recommendations and follow up plan indicated above. All medications come with benefits and risks. Risks may include permanent injury or death and these risks can be minimized with close reassessment and monitoring. Please make it to your scheduled follow ups with PCP and neurosurgery    Discharge Instructions    Discharged to home by car with relative. Discharged via wheelchair, hospital escort: Yes.  Special equipment needed: Not Applicable    Be sure to schedule a follow-up appointment with your primary care doctor or any specialists as instructed.     Discharge Plan:   Diet Plan: Discussed  Activity Level: Discussed  Confirmed Follow up Appointment: Patient to Call and Schedule Appointment  Confirmed Symptoms Management: Discussed  Medication Reconciliation Updated: Yes  Influenza Vaccine Indication: Patient Refuses    I understand that a diet low in cholesterol, fat, and sodium is recommended for good health. Unless I have been given specific instructions below for another diet, I accept this instruction as my diet prescription.   Other diet: Regular    Special Instructions: None    · Is patient discharged on Warfarin / Coumadin?   No     Depression / Suicide Risk    As you are discharged from this [facility] facility, it is important to learn how to keep safe from harming yourself.    Recognize the warning  signs:  · Abrupt changes in personality, positive or negative- including increase in energy   · Giving away possessions  · Change in eating patterns- significant weight changes-  positive or negative  · Change in sleeping patterns- unable to sleep or sleeping all the time   · Unwillingness or inability to communicate  · Depression  · Unusual sadness, discouragement and loneliness  · Talk of wanting to die  · Neglect of personal appearance   · Rebelliousness- reckless behavior  · Withdrawal from people/activities they love  · Confusion- inability to concentrate     If you or a loved one observes any of these behaviors or has concerns about self-harm, here's what you can do:  · Talk about it- your feelings and reasons for harming yourself  · Remove any means that you might use to hurt yourself (examples: pills, rope, extension cords, firearm)  · Get professional help from the community (Mental Health, Substance Abuse, psychological counseling)  · Do not be alone:Call your Safe Contact- someone whom you trust who will be there for you.  · Call your local CRISIS HOTLINE 778-0382 or 086-631-3920  · Call your local Children's Mobile Crisis Response Team [place] (643) 324-0169 or www.[employer]  · Call the toll free National Suicide Prevention Hotlines   · National Suicide Prevention Lifeline 093-544-OTGH (4366)  · National Hope Line Network 800-SUICIDE (402-2201)

## 2023-04-24 ENCOUNTER — APPOINTMENT (OUTPATIENT)
Dept: RADIOLOGY | Facility: MEDICAL CENTER | Age: 33
End: 2023-04-24
Attending: EMERGENCY MEDICINE

## 2023-04-24 ENCOUNTER — APPOINTMENT (OUTPATIENT)
Dept: RADIOLOGY | Facility: MEDICAL CENTER | Age: 33
End: 2023-04-24
Attending: HOSPITALIST

## 2023-04-24 ENCOUNTER — APPOINTMENT (OUTPATIENT)
Dept: CARDIOLOGY | Facility: MEDICAL CENTER | Age: 33
End: 2023-04-24
Attending: HOSPITALIST

## 2023-04-24 ENCOUNTER — HOSPITAL ENCOUNTER (OUTPATIENT)
Facility: MEDICAL CENTER | Age: 33
End: 2023-04-25
Attending: EMERGENCY MEDICINE | Admitting: HOSPITALIST

## 2023-04-24 DIAGNOSIS — R07.9 LEFT-SIDED CHEST PAIN: ICD-10-CM

## 2023-04-24 DIAGNOSIS — G45.9 TIA (TRANSIENT ISCHEMIC ATTACK): ICD-10-CM

## 2023-04-24 DIAGNOSIS — I16.1 HYPERTENSIVE EMERGENCY: ICD-10-CM

## 2023-04-24 PROBLEM — E66.9 OBESITY (BMI 30.0-34.9): Status: ACTIVE | Noted: 2023-04-24

## 2023-04-24 PROBLEM — E80.6 HYPERBILIRUBINEMIA: Status: ACTIVE | Noted: 2023-04-24

## 2023-04-24 LAB
ABO + RH BLD: NORMAL
ABO GROUP BLD: NORMAL
ALBUMIN SERPL BCP-MCNC: 4.7 G/DL (ref 3.2–4.9)
ALBUMIN/GLOB SERPL: 1.7 G/DL
ALP SERPL-CCNC: 81 U/L (ref 30–99)
ALT SERPL-CCNC: 35 U/L (ref 2–50)
ANION GAP SERPL CALC-SCNC: 15 MMOL/L (ref 7–16)
APTT PPP: 27.3 SEC (ref 24.7–36)
AST SERPL-CCNC: 30 U/L (ref 12–45)
BASOPHILS # BLD AUTO: 1.1 % (ref 0–1.8)
BASOPHILS # BLD: 0.06 K/UL (ref 0–0.12)
BILIRUB SERPL-MCNC: 1.7 MG/DL (ref 0.1–1.5)
BLD GP AB SCN SERPL QL: NORMAL
BUN SERPL-MCNC: 9 MG/DL (ref 8–22)
CALCIUM ALBUM COR SERPL-MCNC: 9.5 MG/DL (ref 8.5–10.5)
CALCIUM SERPL-MCNC: 10.1 MG/DL (ref 8.4–10.2)
CHLORIDE SERPL-SCNC: 105 MMOL/L (ref 96–112)
CO2 SERPL-SCNC: 23 MMOL/L (ref 20–33)
CREAT SERPL-MCNC: 0.91 MG/DL (ref 0.5–1.4)
EKG IMPRESSION: NORMAL
EKG IMPRESSION: NORMAL
EOSINOPHIL # BLD AUTO: 0.07 K/UL (ref 0–0.51)
EOSINOPHIL NFR BLD: 1.3 % (ref 0–6.9)
ERYTHROCYTE [DISTWIDTH] IN BLOOD BY AUTOMATED COUNT: 40.4 FL (ref 35.9–50)
GFR SERPLBLD CREATININE-BSD FMLA CKD-EPI: 113 ML/MIN/1.73 M 2
GLOBULIN SER CALC-MCNC: 2.7 G/DL (ref 1.9–3.5)
GLUCOSE SERPL-MCNC: 93 MG/DL (ref 65–99)
HCT VFR BLD AUTO: 47.6 % (ref 42–52)
HGB BLD-MCNC: 17 G/DL (ref 14–18)
IMM GRANULOCYTES # BLD AUTO: 0.01 K/UL (ref 0–0.11)
IMM GRANULOCYTES NFR BLD AUTO: 0.2 % (ref 0–0.9)
INR PPP: 1.04 (ref 0.87–1.13)
LIPASE SERPL-CCNC: 23 U/L (ref 7–58)
LYMPHOCYTES # BLD AUTO: 1.33 K/UL (ref 1–4.8)
LYMPHOCYTES NFR BLD: 23.9 % (ref 22–41)
MCH RBC QN AUTO: 33.8 PG (ref 27–33)
MCHC RBC AUTO-ENTMCNC: 35.7 G/DL (ref 33.7–35.3)
MCV RBC AUTO: 94.6 FL (ref 81.4–97.8)
MONOCYTES # BLD AUTO: 0.54 K/UL (ref 0–0.85)
MONOCYTES NFR BLD AUTO: 9.7 % (ref 0–13.4)
NEUTROPHILS # BLD AUTO: 3.56 K/UL (ref 1.82–7.42)
NEUTROPHILS NFR BLD: 63.8 % (ref 44–72)
NRBC # BLD AUTO: 0 K/UL
NRBC BLD-RTO: 0 /100 WBC
PLATELET # BLD AUTO: 246 K/UL (ref 164–446)
PMV BLD AUTO: 9.2 FL (ref 9–12.9)
POTASSIUM SERPL-SCNC: 3.8 MMOL/L (ref 3.6–5.5)
PROT SERPL-MCNC: 7.4 G/DL (ref 6–8.2)
PROTHROMBIN TIME: 13.5 SEC (ref 12–14.6)
RBC # BLD AUTO: 5.03 M/UL (ref 4.7–6.1)
RH BLD: NORMAL
SODIUM SERPL-SCNC: 143 MMOL/L (ref 135–145)
TROPONIN T SERPL-MCNC: <6 NG/L (ref 6–19)
WBC # BLD AUTO: 5.6 K/UL (ref 4.8–10.8)

## 2023-04-24 PROCEDURE — G0378 HOSPITAL OBSERVATION PER HR: HCPCS

## 2023-04-24 PROCEDURE — 36415 COLL VENOUS BLD VENIPUNCTURE: CPT

## 2023-04-24 PROCEDURE — 83690 ASSAY OF LIPASE: CPT

## 2023-04-24 PROCEDURE — 70498 CT ANGIOGRAPHY NECK: CPT

## 2023-04-24 PROCEDURE — 0042T CT-CEREBRAL PERFUSION ANALYSIS: CPT

## 2023-04-24 PROCEDURE — 99285 EMERGENCY DEPT VISIT HI MDM: CPT

## 2023-04-24 PROCEDURE — 93005 ELECTROCARDIOGRAM TRACING: CPT

## 2023-04-24 PROCEDURE — A9270 NON-COVERED ITEM OR SERVICE: HCPCS | Performed by: HOSPITALIST

## 2023-04-24 PROCEDURE — 700102 HCHG RX REV CODE 250 W/ 637 OVERRIDE(OP): Performed by: EMERGENCY MEDICINE

## 2023-04-24 PROCEDURE — A9270 NON-COVERED ITEM OR SERVICE: HCPCS | Performed by: EMERGENCY MEDICINE

## 2023-04-24 PROCEDURE — 70496 CT ANGIOGRAPHY HEAD: CPT

## 2023-04-24 PROCEDURE — 70450 CT HEAD/BRAIN W/O DYE: CPT

## 2023-04-24 PROCEDURE — 93005 ELECTROCARDIOGRAM TRACING: CPT | Performed by: EMERGENCY MEDICINE

## 2023-04-24 PROCEDURE — 85610 PROTHROMBIN TIME: CPT

## 2023-04-24 PROCEDURE — 96374 THER/PROPH/DIAG INJ IV PUSH: CPT

## 2023-04-24 PROCEDURE — 86900 BLOOD TYPING SEROLOGIC ABO: CPT

## 2023-04-24 PROCEDURE — 85730 THROMBOPLASTIN TIME PARTIAL: CPT

## 2023-04-24 PROCEDURE — 71045 X-RAY EXAM CHEST 1 VIEW: CPT

## 2023-04-24 PROCEDURE — 80053 COMPREHEN METABOLIC PANEL: CPT

## 2023-04-24 PROCEDURE — 85025 COMPLETE CBC W/AUTO DIFF WBC: CPT

## 2023-04-24 PROCEDURE — 86850 RBC ANTIBODY SCREEN: CPT

## 2023-04-24 PROCEDURE — 94760 N-INVAS EAR/PLS OXIMETRY 1: CPT

## 2023-04-24 PROCEDURE — 700102 HCHG RX REV CODE 250 W/ 637 OVERRIDE(OP): Performed by: HOSPITALIST

## 2023-04-24 PROCEDURE — 86901 BLOOD TYPING SEROLOGIC RH(D): CPT

## 2023-04-24 PROCEDURE — 700111 HCHG RX REV CODE 636 W/ 250 OVERRIDE (IP): Performed by: EMERGENCY MEDICINE

## 2023-04-24 PROCEDURE — 99223 1ST HOSP IP/OBS HIGH 75: CPT | Performed by: HOSPITALIST

## 2023-04-24 PROCEDURE — 84484 ASSAY OF TROPONIN QUANT: CPT

## 2023-04-24 PROCEDURE — 76705 ECHO EXAM OF ABDOMEN: CPT

## 2023-04-24 PROCEDURE — 700117 HCHG RX CONTRAST REV CODE 255: Performed by: EMERGENCY MEDICINE

## 2023-04-24 RX ORDER — PROMETHAZINE HYDROCHLORIDE 25 MG/1
12.5-25 SUPPOSITORY RECTAL EVERY 4 HOURS PRN
Status: DISCONTINUED | OUTPATIENT
Start: 2023-04-24 | End: 2023-04-25 | Stop reason: HOSPADM

## 2023-04-24 RX ORDER — ATORVASTATIN CALCIUM 40 MG/1
80 TABLET, FILM COATED ORAL EVERY EVENING
Status: DISCONTINUED | OUTPATIENT
Start: 2023-04-24 | End: 2023-04-25 | Stop reason: HOSPADM

## 2023-04-24 RX ORDER — ACETAMINOPHEN 325 MG/1
650 TABLET ORAL EVERY 6 HOURS PRN
Status: DISCONTINUED | OUTPATIENT
Start: 2023-04-24 | End: 2023-04-25 | Stop reason: HOSPADM

## 2023-04-24 RX ORDER — PROMETHAZINE HYDROCHLORIDE 25 MG/1
12.5-25 TABLET ORAL EVERY 4 HOURS PRN
Status: DISCONTINUED | OUTPATIENT
Start: 2023-04-24 | End: 2023-04-25 | Stop reason: HOSPADM

## 2023-04-24 RX ORDER — ENALAPRILAT 1.25 MG/ML
1.25 INJECTION INTRAVENOUS ONCE
Status: COMPLETED | OUTPATIENT
Start: 2023-04-24 | End: 2023-04-24

## 2023-04-24 RX ORDER — IBUPROFEN 200 MG
400 TABLET ORAL EVERY 6 HOURS PRN
Status: ON HOLD | COMMUNITY
End: 2023-04-25

## 2023-04-24 RX ORDER — BISACODYL 10 MG
10 SUPPOSITORY, RECTAL RECTAL
Status: DISCONTINUED | OUTPATIENT
Start: 2023-04-24 | End: 2023-04-25 | Stop reason: HOSPADM

## 2023-04-24 RX ORDER — ONDANSETRON 4 MG/1
4 TABLET, ORALLY DISINTEGRATING ORAL EVERY 4 HOURS PRN
Status: DISCONTINUED | OUTPATIENT
Start: 2023-04-24 | End: 2023-04-25 | Stop reason: HOSPADM

## 2023-04-24 RX ORDER — PROCHLORPERAZINE EDISYLATE 5 MG/ML
5-10 INJECTION INTRAMUSCULAR; INTRAVENOUS EVERY 4 HOURS PRN
Status: DISCONTINUED | OUTPATIENT
Start: 2023-04-24 | End: 2023-04-25 | Stop reason: HOSPADM

## 2023-04-24 RX ORDER — ASPIRIN 81 MG/1
324 TABLET, CHEWABLE ORAL ONCE
Status: COMPLETED | OUTPATIENT
Start: 2023-04-24 | End: 2023-04-24

## 2023-04-24 RX ORDER — AMOXICILLIN 250 MG
2 CAPSULE ORAL 2 TIMES DAILY
Status: DISCONTINUED | OUTPATIENT
Start: 2023-04-24 | End: 2023-04-25 | Stop reason: HOSPADM

## 2023-04-24 RX ORDER — ASPIRIN 325 MG
325 TABLET ORAL DAILY
Status: DISCONTINUED | OUTPATIENT
Start: 2023-04-24 | End: 2023-04-25 | Stop reason: HOSPADM

## 2023-04-24 RX ORDER — POLYETHYLENE GLYCOL 3350 17 G/17G
1 POWDER, FOR SOLUTION ORAL
Status: DISCONTINUED | OUTPATIENT
Start: 2023-04-24 | End: 2023-04-25 | Stop reason: HOSPADM

## 2023-04-24 RX ORDER — ONDANSETRON 2 MG/ML
4 INJECTION INTRAMUSCULAR; INTRAVENOUS EVERY 4 HOURS PRN
Status: DISCONTINUED | OUTPATIENT
Start: 2023-04-24 | End: 2023-04-25 | Stop reason: HOSPADM

## 2023-04-24 RX ORDER — LABETALOL HYDROCHLORIDE 5 MG/ML
10 INJECTION, SOLUTION INTRAVENOUS EVERY 4 HOURS PRN
Status: DISCONTINUED | OUTPATIENT
Start: 2023-04-24 | End: 2023-04-25 | Stop reason: HOSPADM

## 2023-04-24 RX ADMIN — ENALAPRILAT 1.25 MG: 1.25 INJECTION INTRAVENOUS at 17:03

## 2023-04-24 RX ADMIN — IOHEXOL 100 ML: 350 INJECTION, SOLUTION INTRAVENOUS at 15:54

## 2023-04-24 RX ADMIN — IOHEXOL 40 ML: 350 INJECTION, SOLUTION INTRAVENOUS at 15:53

## 2023-04-24 RX ADMIN — ATORVASTATIN CALCIUM 80 MG: 40 TABLET, FILM COATED ORAL at 18:33

## 2023-04-24 RX ADMIN — ASPIRIN 81 MG 324 MG: 81 TABLET ORAL at 16:24

## 2023-04-24 ASSESSMENT — ENCOUNTER SYMPTOMS
VOMITING: 0
DIARRHEA: 0
CHILLS: 0
COUGH: 0
HEADACHES: 0
CONSTITUTIONAL NEGATIVE: 1
ABDOMINAL PAIN: 0
RESPIRATORY NEGATIVE: 1
NAUSEA: 0
FOCAL WEAKNESS: 1
FEVER: 0
BRUISES/BLEEDS EASILY: 0
WHEEZING: 0
DIAPHORESIS: 0
HEARTBURN: 0
EYES NEGATIVE: 1
CONSTIPATION: 0
MUSCULOSKELETAL NEGATIVE: 1
LOSS OF CONSCIOUSNESS: 0
DIZZINESS: 0
CARDIOVASCULAR NEGATIVE: 1
PALPITATIONS: 0
NERVOUS/ANXIOUS: 1
SEIZURES: 0
BLOOD IN STOOL: 0
DOUBLE VISION: 0
GASTROINTESTINAL NEGATIVE: 1
HEMOPTYSIS: 0

## 2023-04-24 ASSESSMENT — COGNITIVE AND FUNCTIONAL STATUS - GENERAL
SUGGESTED CMS G CODE MODIFIER MOBILITY: CH
DAILY ACTIVITIY SCORE: 24
SUGGESTED CMS G CODE MODIFIER DAILY ACTIVITY: CH
MOBILITY SCORE: 24

## 2023-04-24 ASSESSMENT — PAIN DESCRIPTION - PAIN TYPE: TYPE: ACUTE PAIN

## 2023-04-24 ASSESSMENT — FIBROSIS 4 INDEX
FIB4 SCORE: 0.7
FIB4 SCORE: 0.7

## 2023-04-24 ASSESSMENT — PATIENT HEALTH QUESTIONNAIRE - PHQ9
1. LITTLE INTEREST OR PLEASURE IN DOING THINGS: NOT AT ALL
SUM OF ALL RESPONSES TO PHQ9 QUESTIONS 1 AND 2: 0
2. FEELING DOWN, DEPRESSED, IRRITABLE, OR HOPELESS: NOT AT ALL

## 2023-04-24 ASSESSMENT — LIFESTYLE VARIABLES
AVERAGE NUMBER OF DAYS PER WEEK YOU HAVE A DRINK CONTAINING ALCOHOL: 7
TOTAL SCORE: 0
EVER HAD A DRINK FIRST THING IN THE MORNING TO STEADY YOUR NERVES TO GET RID OF A HANGOVER: NO
ALCOHOL_USE: YES
HAVE YOU EVER FELT YOU SHOULD CUT DOWN ON YOUR DRINKING: NO
EVER FELT BAD OR GUILTY ABOUT YOUR DRINKING: NO
ON A TYPICAL DAY WHEN YOU DRINK ALCOHOL HOW MANY DRINKS DO YOU HAVE: 2
CONSUMPTION TOTAL: NEGATIVE
HOW MANY TIMES IN THE PAST YEAR HAVE YOU HAD 5 OR MORE DRINKS IN A DAY: 0
TOTAL SCORE: 0
TOTAL SCORE: 0
HAVE PEOPLE ANNOYED YOU BY CRITICIZING YOUR DRINKING: NO

## 2023-04-24 NOTE — ED PROVIDER NOTES
"ER Provider Note    Scribed for Mahamed Kasper D.O. by Erika Swenson. 4/24/2023  3:23 PM    Primary Care Provider: No primary care provider reported.    CHIEF COMPLAINT  Chief Complaint   Patient presents with    Chest Pain    Shortness of Breath       HPI/ROS  LIMITATION TO HISTORY   None  OUTSIDE HISTORIAN(S):  None    Adria Chacon is a 32 y.o. male who presents to the Emergency Department for arm and leg pain onset 1 and half hours ago. Patient reports that he was driving when he suddenly started to feel left arm and left leg tingling. He describes the tingling in his hand as \"pins and needles.\" He reports associated symptoms of heart racing, pinches of pain up the neck, mild chest pain that starts at the base of the head and into the chest. He notes that he had shortness of breath that would come go before arriving at the ED. Upon arrival, he noted that his left \"arm felt limp when he went to tough his face\" and had trouble walking in. He denies nausea or vomiting. Patient states that he has a history of high blood pressure and obesity but quit smoking in 2013 and 2014 when he was placed on blood thinners. He notes that he is no longer taking blood thinners.     ROS as per HPI.    PAST MEDICAL HISTORY  No past medical history noted.    SURGICAL HISTORY  Past Surgical History:   Procedure Laterality Date    APPENDECTOMY      at age 10 (2002)    OTHER         FAMILY HISTORY  No family history noted.    SOCIAL HISTORY   reports that he has quit smoking. He has never used smokeless tobacco. He reports current alcohol use. He reports that he does not use drugs.    CURRENT MEDICATIONS  Current Discharge Medication List        CONTINUE these medications which have NOT CHANGED    Details   ibuprofen (MOTRIN) 200 MG Tab Take 400 mg by mouth every 6 hours as needed for Mild Pain.             ALLERGIES  Raspberry and Diprivan    PHYSICAL EXAM  BP (!) 153/112   Pulse (!) 110   Temp 36.1 °C (97 °F) (Temporal)   " Resp (!) 22   Wt 110 kg (242 lb 8.1 oz)   SpO2 99%   BMI 33.82 kg/m²     General: No acute distress.  HENT: Normocephalic, Mucus membranes are moist.   Chest: Lungs have even and unlabored respirations, Clear to auscultation.   Cardiovascular: Regular rate and regular rhythm, No peripheral cyanosis.  Abdomen: Non distended.  Neuro: Awake, Conversive, Able to relay recent events. Awake and oriented x 4. No facial droop, Able to recognize objects, Bilateral upper extremities have normal sensation, No droop or coordination deficient.   Extremities: Left extremity has mild drift against gravity. Coordination deficient and decreased sensation apparent to the right.  Psychiatric: Calm and cooperative.       EXTERNAL RECORDS REVIEWED  Past records indicate that the patient was admitted on February 21st, 2022 for back pain and memory loss.       INITIAL ASSESSMENT  Patient presents with episodes of tingling, sensation loss, and difficulty using his arms and legs onset 2 PM while driving. Symptoms are isolated to left lower extremities. Patient is complaining of chest pain in left clavicular area. He has no shortness of breath. He has a history of smoking and hypertension, but is not on medication. His blood pressure is currently elevated but is improving without intervention. Patient scores 3 on a stroke scale.     ED Observation Status? No, patient does not meet criteria for ED observation due to possible admission.     DIAGNOSTIC STUDIES    Labs:   Results for orders placed or performed during the hospital encounter of 04/24/23   CBC w/ Differential   Result Value Ref Range    WBC 5.6 4.8 - 10.8 K/uL    RBC 5.03 4.70 - 6.10 M/uL    Hemoglobin 17.0 14.0 - 18.0 g/dL    Hematocrit 47.6 42.0 - 52.0 %    MCV 94.6 81.4 - 97.8 fL    MCH 33.8 (H) 27.0 - 33.0 pg    MCHC 35.7 (H) 33.7 - 35.3 g/dL    RDW 40.4 35.9 - 50.0 fL    Platelet Count 246 164 - 446 K/uL    MPV 9.2 9.0 - 12.9 fL    Neutrophils-Polys 63.80 44.00 - 72.00 %     Lymphocytes 23.90 22.00 - 41.00 %    Monocytes 9.70 0.00 - 13.40 %    Eosinophils 1.30 0.00 - 6.90 %    Basophils 1.10 0.00 - 1.80 %    Immature Granulocytes 0.20 0.00 - 0.90 %    Nucleated RBC 0.00 /100 WBC    Neutrophils (Absolute) 3.56 1.82 - 7.42 K/uL    Lymphs (Absolute) 1.33 1.00 - 4.80 K/uL    Monos (Absolute) 0.54 0.00 - 0.85 K/uL    Eos (Absolute) 0.07 0.00 - 0.51 K/uL    Baso (Absolute) 0.06 0.00 - 0.12 K/uL    Immature Granulocytes (abs) 0.01 0.00 - 0.11 K/uL    NRBC (Absolute) 0.00 K/uL   Complete Metabolic Panel (CMP)   Result Value Ref Range    Sodium 143 135 - 145 mmol/L    Potassium 3.8 3.6 - 5.5 mmol/L    Chloride 105 96 - 112 mmol/L    Co2 23 20 - 33 mmol/L    Anion Gap 15.0 7.0 - 16.0    Glucose 93 65 - 99 mg/dL    Bun 9 8 - 22 mg/dL    Creatinine 0.91 0.50 - 1.40 mg/dL    Calcium 10.1 8.4 - 10.2 mg/dL    AST(SGOT) 30 12 - 45 U/L    ALT(SGPT) 35 2 - 50 U/L    Alkaline Phosphatase 81 30 - 99 U/L    Total Bilirubin 1.7 (H) 0.1 - 1.5 mg/dL    Albumin 4.7 3.2 - 4.9 g/dL    Total Protein 7.4 6.0 - 8.2 g/dL    Globulin 2.7 1.9 - 3.5 g/dL    A-G Ratio 1.7 g/dL   Lipase   Result Value Ref Range    Lipase 23 7 - 58 U/L   PROTHROMBIN TIME   Result Value Ref Range    PT 13.5 12.0 - 14.6 sec    INR 1.04 0.87 - 1.13   APTT   Result Value Ref Range    APTT 27.3 24.7 - 36.0 sec   COD (ADULT)   Result Value Ref Range    ABO Grouping Only O     Rh Grouping Only POS     Antibody Screen-Cod NEG    TROPONIN   Result Value Ref Range    Troponin T <6 6 - 19 ng/L   ABO Rh Confirm   Result Value Ref Range    ABO Rh Confirm O POS    CORRECTED CALCIUM   Result Value Ref Range    Correct Calcium 9.5 8.5 - 10.5 mg/dL   ESTIMATED GFR   Result Value Ref Range    GFR (CKD-EPI) 113 >60 mL/min/1.73 m 2   EKG   Result Value Ref Range    Report       [facility] Emergency Dept.    Test Date:  2023-04-24  Pt Name:    KELY CHRISTIAN                   Department: EDS  MRN:        [id number]                       Room:  Gender:     Male                         Technician: STEPHANIA  :        1990                   Requested By:ER TRIAGE PROTOCOL  Order #:    [id number]                    Reading MD: STACIA MILLS D.O.    Measurements  Intervals                                Axis  Rate:       108                          P:  HI:                                      QRS:        -42  QRSD:       92                           T:          46  QT:         326  QTc:        437    Interpretive Statements  Sinus rhythm  Compared to ECG 2021 01:19:34      Sinus rhythm no longer present  Left-axis deviation no longer present  Electronically Signed On 2023 16:18:51 PDT by STACIA MILLS D.O.     EKG   Result Value Ref Range    Report       [facility] Emergency Dept.    Test Date:  2023  Pt Name:    [facility]: [facility]  MRN:        [id number]                      Room:  Gender:     Male                         Technician: STEPHANIA  :        1990                   Requested By:STACIA MILLS  Order #:    [id number]                    Reading MD: STACIA MILLS D.O.    Measurements  Intervals                                Axis  Rate:       88                           P:          58  HI:         137                          QRS:        -35  QRSD:       100                          T:          33  QT:         365  QTc:        442    Interpretive Statements  Sinus rhythm  Left axis deviation  Compared to ECG 2023 14:27:39  Left-axis deviation now present    no change from previous  Electronically Signed On 2023 16:18:03 PDT by STACIA MILLS D.O.          EKG:   Results for orders placed or performed during the hospital encounter of 23   EKG   Result Value Ref Range    Report       [facility] Emergency Dept.    Test Date:  2023  Pt Name:    [facility]: [facility]  MRN:        [id number]                       Room:  Gender:     Male                         Technician: STEPHANIA  :        1990                   Requested By:ER TRIAGE PROTOCOL  Order #:    [id number]                    Reading MD: STACIA MILLS D.O.    Measurements  Intervals                                Axis  Rate:       108                          P:  WI:                                      QRS:        -42  QRSD:       92                           T:          46  QT:         326  QTc:        437    Interpretive Statements  Sinus rhythm  Compared to ECG 2021 01:19:34      Sinus rhythm no longer present  Left-axis deviation no longer present  Electronically Signed On 2023 16:18:51 PDT by STACIA MILLS D.O.     EKG   Result Value Ref Range    Report       [facility] Emergency Dept.    Test Date:  2023  Pt Name:    KELY CHRISTIAN                   Department: [facility]  MRN:        [id number]                      Room:  Gender:     Male                         Technician: STEPHANIA  :        1990                   Requested By:STACIA MILLS  Order #:    [id number]                    Reading MD: STACIA MILLS D.O.    Measurements  Intervals                                Axis  Rate:       88                           P:          58  WI:         137                          QRS:        -35  QRSD:       100                          T:          33  QT:         365  QTc:        442    Interpretive Statements  Sinus rhythm  Left axis deviation  Compared to ECG 2023 14:27:39  Left-axis deviation now present    no change from previous  Electronically Signed On 2023 16:18:03 PDT by STACIA MILLS D.O.        I have independently interpreted the above EKG.    Radiology:   The attending emergency physician has independently interpreted the diagnostic imaging associated with this visit and am waiting the final reading from the radiologist.   Preliminary interpretation is as  follows: CT head shows no bleed  Radiologist interpretation:   US-RUQ   Final Result      1.  The liver is heterogeneously echogenic, most compatible with fatty infiltration, however other hepatocellular disease processes are in the differential diagnosis.      CT-CTA NECK WITH & W/O-POST PROCESSING   Final Result      CT angiogram of the neck within normal limits.      CT-CTA HEAD WITH & W/O-POST PROCESS   Final Result      CT angiogram of the [place] within normal limits.      CT-CEREBRAL PERFUSION ANALYSIS   Final Result      1.  Cerebral blood flow less than 30% likely representing completed infarct = 0 mL.      2.  T Max more than 6 seconds likely representing combination of completed infarct and ischemia = 0 mL.      3.  Mismatched volume likely representing ischemic brain/penumbra = None      4.  Please note that the cerebral perfusion was performed on the limited brain tissue around the basal ganglia region. Infarct/ischemia outside the CT perfusion sections can be missed in this study.      DX-CHEST-PORTABLE (1 VIEW)   Final Result      No radiographic evidence of acute cardiopulmonary process.      CT-HEAD W/O   Final Result      Head CT without contrast within normal limits. No evidence of acute cerebral infarction, hemorrhage or mass lesion.         MR-BRAIN-W/O    (Results Pending)   EC-ECHOCARDIOGRAM COMPLETE W/ CONT    (Results Pending)        COURSE & MEDICAL DECISION MAKING     COURSE AND PLAN  3:23 PM - Patient seen and examined at bedside. Discussed plan of care, including labs, imaging and EKG. Patient agrees to the plan of care. The patient will be medicated with aspirin 324 mg tablet, Vasotec injection 1.25 mg. Ordered for EKG, CMP, Lipase, CBC w/ Diff, Troponin, COD, APTT, Prothrombin, Estimated GFR, ABO Rh Confirm, DX-Chest, CT-CTA Neck w/ and w/out, CT-CTA Head w/ and w/out, CT-Cerebral perfusion, and CT Head w/o to evaluate his symptoms.     3:50 PM - Spoke with Dr. Millan, Neurology,  about the patient's condition. Dr. Millan read the x-ray prior to radiology interpretatation and states that the results show no signs of large vessel stroke.     4:05 PM - Patient was reevaluated at bedside and he attempted to ambulate the left leg. He showed concerns of disability from potential stroke.     4:07 PM -  Spoke with Dr. Millan again who said tPA is recommended to prevent disability if patient is agreeable.     4:10 PM - Patient was reevaluated at bedside. Spoke with the patient at length. Patient's leg weakness and loss in sensation and resolved while I  was in the room. tPA is no longer a recommendation.     4:53 PM I discussed the patient's case and the above findings with Dr. Richardson (Hospitalist) who agrees to evaluate the patient for hospitalization.     4:54 PM - I reevaluated the patient at bedside. I informed the patient of my plan to admit today given the patient's current presentation and diagnostic study results. Patient verbalizes understanding and support with my plan for admission.     The total critical care time on this patient is 35 minutes, resuscitating patient, speaking with admitting physician, and deciphering test results. This 35 minutes is exclusive of separately billable procedures.     ED Summary: Patient resented with an episode of some chest pain, was very nonspecific.  But he also had left upper left lower extremity tingling and difficulty using left upper extremity.    On my evaluation the patient still had left lower extremity weakness, coordination deficit and decreased sensation.  There is no signs of speech impairment.  Stroke scale of 3 was determined and stroke protocol was initiated.  The patient was brought stat to CT scan, CT scan showed no bleed.  I spoke with Dr. Barroso who evaluated the films and showed that there was no other large vessel disease.    The patient still remains symptomatic, I tried to get him up walking and he had difficulty putting weight on the leg he was  able to walk from triage to the bed without difficulty prior to this.    Spoke with Dr. Jasvir again and there was suggestion for tPA if the patient is amenable to this.    I did speak with the patient concerning this possible treatment, and the risks and benefits, and he took a prolonged period of time to consider this.  As he was considering as his symptoms did resolve.  With that there is no indication for TNK or tPA.  And the patient is admitted for evaluation of TIA.      DISPOSITION AND DISCUSSIONS  I have discussed management of the patient with the following physicians and IRENE's:  Dr. Millan (Neurology), Dr. Richardson (Hospitalist).    Discussion of management with other [facility] or appropriate source(s): Pharmacy    DISPOSITION:  Patient will be hospitalized by Dr. Richardson in guarded condition.     FINAL DIAGNOSIS  1. Left-sided chest pain    2. TIA (transient ischemic attack)    3. Hypertensive emergency      Critical Care time of 35 minutes.      Erika VILLA (Jorgee), am scribing for, and in the presence of, Mahamed Kasper D.O..    Electronically signed by: Erika Swenson (Scribe), 4/24/2023    Mahamed VILLA D.O. personally performed the services described in this documentation, as scribed by Erika Swenson in my presence, and it is both accurate and complete.    The note accurately reflects work and decisions made by me.  Mahamed Kasper D.O.  4/24/2023  9:24 PM

## 2023-04-25 ENCOUNTER — APPOINTMENT (OUTPATIENT)
Dept: RADIOLOGY | Facility: MEDICAL CENTER | Age: 33
End: 2023-04-25
Attending: FAMILY MEDICINE

## 2023-04-25 ENCOUNTER — APPOINTMENT (OUTPATIENT)
Dept: RADIOLOGY | Facility: MEDICAL CENTER | Age: 33
End: 2023-04-25
Attending: HOSPITALIST

## 2023-04-25 ENCOUNTER — APPOINTMENT (OUTPATIENT)
Dept: CARDIOLOGY | Facility: MEDICAL CENTER | Age: 33
End: 2023-04-25
Attending: HOSPITALIST

## 2023-04-25 VITALS
TEMPERATURE: 98.2 F | OXYGEN SATURATION: 95 % | SYSTOLIC BLOOD PRESSURE: 131 MMHG | RESPIRATION RATE: 18 BRPM | BODY MASS INDEX: 33.74 KG/M2 | DIASTOLIC BLOOD PRESSURE: 74 MMHG | HEART RATE: 98 BPM | HEIGHT: 72 IN | WEIGHT: 249.12 LBS

## 2023-04-25 LAB
ALBUMIN SERPL BCP-MCNC: 4.3 G/DL (ref 3.2–4.9)
ALP SERPL-CCNC: 72 U/L (ref 30–99)
ALT SERPL-CCNC: 33 U/L (ref 2–50)
AMPHET UR QL SCN: NEGATIVE
ANION GAP SERPL CALC-SCNC: 14 MMOL/L (ref 7–16)
AST SERPL-CCNC: 34 U/L (ref 12–45)
BARBITURATES UR QL SCN: NEGATIVE
BENZODIAZ UR QL SCN: NEGATIVE
BILIRUB CONJ SERPL-MCNC: 0.3 MG/DL (ref 0.1–0.5)
BILIRUB INDIRECT SERPL-MCNC: 2.3 MG/DL (ref 0–1)
BILIRUB SERPL-MCNC: 2.6 MG/DL (ref 0.1–1.5)
BUN SERPL-MCNC: 10 MG/DL (ref 8–22)
BZE UR QL SCN: NEGATIVE
CALCIUM SERPL-MCNC: 9.3 MG/DL (ref 8.4–10.2)
CANNABINOIDS UR QL SCN: NEGATIVE
CHLORIDE SERPL-SCNC: 103 MMOL/L (ref 96–112)
CO2 SERPL-SCNC: 23 MMOL/L (ref 20–33)
CREAT SERPL-MCNC: 0.92 MG/DL (ref 0.5–1.4)
D DIMER PPP IA.FEU-MCNC: 0.42 UG/ML (FEU) (ref 0–0.5)
ERYTHROCYTE [DISTWIDTH] IN BLOOD BY AUTOMATED COUNT: 43.6 FL (ref 35.9–50)
FIBRINOGEN PPP-MCNC: 267 MG/DL (ref 215–460)
GFR SERPLBLD CREATININE-BSD FMLA CKD-EPI: 112 ML/MIN/1.73 M 2
GLUCOSE SERPL-MCNC: 89 MG/DL (ref 65–99)
HAV IGM SERPL QL IA: NORMAL
HBV CORE IGM SER QL: NORMAL
HBV SURFACE AG SER QL: NORMAL
HCT VFR BLD AUTO: 46.4 % (ref 42–52)
HCV AB SER QL: NORMAL
HGB BLD-MCNC: 15.8 G/DL (ref 14–18)
INR PPP: 1.02 (ref 0.87–1.13)
LDH SERPL L TO P-CCNC: 135 U/L (ref 107–266)
LV EJECT FRACT  99904: 60
LV EJECT FRACT MOD 2C 99903: 56.02
LV EJECT FRACT MOD 4C 99902: 56.22
LV EJECT FRACT MOD BP 99901: 48.38
MCH RBC QN AUTO: 33.1 PG (ref 27–33)
MCHC RBC AUTO-ENTMCNC: 34.1 G/DL (ref 33.7–35.3)
MCV RBC AUTO: 97.3 FL (ref 81.4–97.8)
METHADONE UR QL SCN: NEGATIVE
OPIATES UR QL SCN: NEGATIVE
OXYCODONE UR QL SCN: NEGATIVE
PCP UR QL SCN: NEGATIVE
PLATELET # BLD AUTO: 213 K/UL (ref 164–446)
PMV BLD AUTO: 9.9 FL (ref 9–12.9)
POTASSIUM SERPL-SCNC: 3.6 MMOL/L (ref 3.6–5.5)
PROPOXYPH UR QL SCN: NEGATIVE
PROT SERPL-MCNC: 6.7 G/DL (ref 6–8.2)
PROTHROMBIN TIME: 13.3 SEC (ref 12–14.6)
RBC # BLD AUTO: 4.77 M/UL (ref 4.7–6.1)
SODIUM SERPL-SCNC: 140 MMOL/L (ref 135–145)
TSH SERPL DL<=0.005 MIU/L-ACNC: 2.76 UIU/ML (ref 0.38–5.33)
WBC # BLD AUTO: 5 K/UL (ref 4.8–10.8)

## 2023-04-25 PROCEDURE — 85027 COMPLETE CBC AUTOMATED: CPT

## 2023-04-25 PROCEDURE — 700102 HCHG RX REV CODE 250 W/ 637 OVERRIDE(OP): Performed by: HOSPITALIST

## 2023-04-25 PROCEDURE — 80074 ACUTE HEPATITIS PANEL: CPT

## 2023-04-25 PROCEDURE — 36415 COLL VENOUS BLD VENIPUNCTURE: CPT

## 2023-04-25 PROCEDURE — 70551 MRI BRAIN STEM W/O DYE: CPT

## 2023-04-25 PROCEDURE — 72141 MRI NECK SPINE W/O DYE: CPT

## 2023-04-25 PROCEDURE — 80048 BASIC METABOLIC PNL TOTAL CA: CPT

## 2023-04-25 PROCEDURE — G0378 HOSPITAL OBSERVATION PER HR: HCPCS

## 2023-04-25 PROCEDURE — 85610 PROTHROMBIN TIME: CPT

## 2023-04-25 PROCEDURE — 93306 TTE W/DOPPLER COMPLETE: CPT

## 2023-04-25 PROCEDURE — 85379 FIBRIN DEGRADATION QUANT: CPT

## 2023-04-25 PROCEDURE — 99239 HOSP IP/OBS DSCHRG MGMT >30: CPT | Performed by: FAMILY MEDICINE

## 2023-04-25 PROCEDURE — A9270 NON-COVERED ITEM OR SERVICE: HCPCS | Performed by: HOSPITALIST

## 2023-04-25 PROCEDURE — 80307 DRUG TEST PRSMV CHEM ANLYZR: CPT

## 2023-04-25 PROCEDURE — 94760 N-INVAS EAR/PLS OXIMETRY 1: CPT

## 2023-04-25 PROCEDURE — 83615 LACTATE (LD) (LDH) ENZYME: CPT

## 2023-04-25 PROCEDURE — 85384 FIBRINOGEN ACTIVITY: CPT

## 2023-04-25 PROCEDURE — 84443 ASSAY THYROID STIM HORMONE: CPT

## 2023-04-25 PROCEDURE — 93306 TTE W/DOPPLER COMPLETE: CPT | Mod: 26 | Performed by: INTERNAL MEDICINE

## 2023-04-25 PROCEDURE — 80076 HEPATIC FUNCTION PANEL: CPT

## 2023-04-25 RX ORDER — METOPROLOL SUCCINATE 25 MG/1
12.5 TABLET, EXTENDED RELEASE ORAL DAILY
Qty: 15 TABLET | Refills: 0 | Status: SHIPPED | OUTPATIENT
Start: 2023-04-25 | End: 2023-08-01

## 2023-04-25 RX ADMIN — ASPIRIN 325 MG: 325 TABLET ORAL at 04:38

## 2023-04-25 NOTE — CARE PLAN
Pt resting in bed, no signs of acute distress, denies any numbness to any extremity at this moment. Pt awaiting mri/echo.   Call light is within reach.  Problem: Knowledge Deficit - Standard  Goal: Patient and family/care givers will demonstrate understanding of plan of care, disease process/condition, diagnostic tests and medications  Outcome: Progressing   The patient is Stable - Low risk of patient condition declining or worsening    Shift Goals  Clinical Goals: MRI/echo  Patient Goals: to go home

## 2023-04-25 NOTE — DISCHARGE INSTRUCTIONS
Diet: Diet Order Diet: Regular  Activity: As tolerated  Follow Up: Please follow up with your primary care in 1-2 weeks.  Please check your blood pressure twice daily while at home - please keep a log of these blood pressures in a log book to present to your primary care provider.  If your blood pressure is less than 100/50, or greater than 180/100, please seek medical attention. I have started you on an aspirin in case this episode represented a TIA, but I suspect this was likely related to anxiety and possibly supplements, as well as with elevated blood pressures. I have started you on Metoprolol for the blood pressures, and you will get a call from the [facility] regarding an appointment - if you have had no further symptoms, they may stop your aspirin. Please stop taking all supplements.   Disposition:Home  Diagnosis: TIA (transient ischemic attack) vs hypertensive emergency vs panic attack    Follow up with your Primary Care Provider Pcp Pt States None as scheduled or sooner if your symptoms persist or worsen.  Return to Emergency Room for severe chest pain, shortness of breath, signs of a stroke, or any other emergencies.

## 2023-04-25 NOTE — PROGRESS NOTES
Report received from ADOLFO Ballesteros. Patient is awake. Call light and belongings within reach. Bed alarm activated.

## 2023-04-25 NOTE — DISCHARGE SUMMARY
Discharge Summary    CHIEF COMPLAINT ON ADMISSION  Chief Complaint   Patient presents with    Chest Pain    Shortness of Breath       Reason for Admission  Chest Pain; SOB; Dizziness; Arm Pa*     Admission Date  4/24/2023    CODE STATUS  Full Code    HPI & HOSPITAL COURSE  This is a 33yo male with a history of thoracic disc herniation and no other significant history who presented to hospital with chest pain left arm and leg tingling and palpitations. By the time pt presented to hospital, symptoms had resolved.  On initial presentation to hospital, pt's BP was 153/112 and he had mild tachycardia to 110. CTA head and neck were negative, as was MRI - ERP noted a mild L extremity drift and coordination deficit which had resolved by the time our admitting physician admitted the patient. The patient's neurologic exam is totally normal today, as are his blood pressures.      Pt's bilirubin has risen some while in hospital - RUQ u/s showed fatty liver and pt reports a significant EtOH history, but decreased usage lately. He does attest to using hydroxycut and testosterone supplementation as an outpatient, both of which can contribute to elevated bilirubin - he has no RUQ symptoms, and hepatitis panel is pending.  The patient does wish to discharge - I suspect that the myriad of symptoms that he has - tachycardia, hypertension, bilirubin elevation, L sided tingling etc are all related to supplement usage and anxiety as symptoms started when he drove by his ex fiances place of work. I suspect hypertensive emergency as the etiology of his symptoms, but cannot rule out TIA given his abnormal examination in the ER - as such, I have told him to stop all supplements, take an ASA daily and have started a beta blocker for his hypertension (he reports a history of HTN and had stopped meds). He will be set up with a Zio patch, set up with stroke bridge clinic, and could potentially stop ASA at that time if no further events. I have  advised him of his bilirubin abnormality, and that it could also indicate Leon - he is aware he needs to follow up with his PCP to trend that. He is stable for discharge with close follow up.    Therefore, he is discharged in good and stable condition to home with close outpatient follow-up.    The patient recovered much more quickly than anticipated on admission.    Discharge Date  4/25/23     FOLLOW UP ITEMS POST DISCHARGE  Stroke [facility] clinic referral in, Zio patch placed, pt to see his PCP in one week with blood pressure log book    DISCHARGE DIAGNOSES  Principal Problem:    TIA (transient ischemic attack) POA: Yes  Active Problems:    Obesity (BMI 30.0-34.9) POA: Unknown    Hyperbilirubinemia POA: Unknown  Resolved Problems:    * No resolved hospital problems. *      FOLLOW UP  No future appointments.  [facility]  [address]  [place] [address]  # [phone]  Follow up        MEDICATIONS ON DISCHARGE     Medication List        START taking these medications        Instructions   aspirin EC 81 MG Tbec  Commonly known as: ECOTRIN   Take 1 Tablet by mouth every day.  Dose: 81 mg     metoprolol SR 25 MG Tb24  Commonly known as: TOPROL XL   Take 0.5 Tablets by mouth every day.  Dose: 12.5 mg            STOP taking these medications      ibuprofen 200 MG Tabs  Commonly known as: MOTRIN              Allergies  Allergies   Allergen Reactions    Raspberry Anaphylaxis    Diprivan      Pt reports that he was asleep for 2 hours, not able to wake up       DIET  Orders Placed This Encounter   Procedures    Diet Order Diet: Regular     Standing Status:   Standing     Number of Occurrences:   1     Order Specific Question:   Diet:     Answer:   Regular [1]       ACTIVITY  As tolerated.  Weight bearing as tolerated    CONSULTATIONS  None    PROCEDURES  None    LABORATORY  Lab Results   Component Value Date    SODIUM 140 04/25/2023    POTASSIUM 3.6 04/25/2023    CHLORIDE 103 04/25/2023    CO2 23  04/25/2023    GLUCOSE 89 04/25/2023    BUN 10 04/25/2023    CREATININE 0.92 04/25/2023        Lab Results   Component Value Date    WBC 5.0 04/25/2023    HEMOGLOBIN 15.8 04/25/2023    HEMATOCRIT 46.4 04/25/2023    PLATELETCT 213 04/25/2023        Total time of the discharge process exceeds 39 minutes.

## 2023-04-25 NOTE — H&P
Hospital Medicine History & Physical Note    Date of Service  4/24/2023    Primary Care Physician  Pcp Pt States None    Consultants  neurology    Specialist Names: Dr. Lito Aden    Code Status  Full Code    Chief Complaint  Chief Complaint   Patient presents with    Chest Pain    Shortness of Breath       History of Presenting Illness  Adria Chacon is a 32 y.o. male who presented 4/24/2023 with sudden onset of weakness in the left arm and left leg.  Patient was driving his kids home from school around 2 PM when suddenly developed the symptoms.  The patient thus came into the hospital for evaluation and by 4 PM the patient's symptoms have completely resolved.  At this point patient will be evaluated overnight in the hospital under neurological monitoring for stabilization of symptoms and we will evaluate him with an MRI of the head as well as an echocardiogram.  Patient will be started on aspirin and statin.  The emergency room physician discussed the case with neurology and neurology at this point does not recommend tPA given his symptoms have resolved.  Initial recommendation was for administration of tPA which the patient refused.  At this point we will monitor his blood pressure.  The patient will need outpatient weight loss management program..    I discussed the plan of care with patient, bedside RN, and emergency room physician .    Review of Systems  Review of Systems   Constitutional: Negative.  Negative for chills, diaphoresis and fever.   HENT: Negative.     Eyes: Negative.  Negative for double vision.   Respiratory: Negative.  Negative for cough, hemoptysis and wheezing.    Cardiovascular: Negative.  Negative for chest pain, palpitations and leg swelling.   Gastrointestinal: Negative.  Negative for abdominal pain, blood in stool, constipation, diarrhea, heartburn, nausea and vomiting.   Genitourinary: Negative.  Negative for frequency, hematuria and urgency.   Musculoskeletal: Negative.   Negative for joint pain.   Skin: Negative.  Negative for itching and rash.   Neurological:  Positive for focal weakness (Left upper arm and left lower leg lasting about 2 hours). Negative for dizziness, seizures, loss of consciousness and headaches.   Endo/Heme/Allergies: Negative.  Does not bruise/bleed easily.   Psychiatric/Behavioral:  Negative for suicidal ideas. The patient is nervous/anxious.    All other systems reviewed and are negative.    Past Medical History   has no past medical history on file.    Surgical History   has a past surgical history that includes other and appendectomy.     Family History  Grandparents had strong family history of strokes  Family history reviewed with patient. There is family history that is pertinent to the chief complaint.     Social History   reports that he has quit smoking. He has never used smokeless tobacco. He reports current alcohol use. He reports that he does not use drugs.    Allergies  Allergies   Allergen Reactions    Raspberry Anaphylaxis    Diprivan      Pt reports that he was asleep for 2 hours, not able to wake up       Medications  Prior to Admission Medications   Prescriptions Last Dose Informant Patient Reported? Taking?   ibuprofen (MOTRIN) 200 MG Tab 4/24/2023 at 0400 Patient Yes Yes   Sig: Take 400 mg by mouth every 6 hours as needed for Mild Pain.      Facility-Administered Medications: None       Physical Exam  Temp:  [36.1 °C (97 °F)-36.8 °C (98.2 °F)] 36.8 °C (98.2 °F)  Pulse:  [] 87  Resp:  [15-22] 18  BP: (131-160)/(100-112) 153/103  SpO2:  [95 %-99 %] 95 %  Blood Pressure: (!) 153/103   Temperature: 36.8 °C (98.2 °F)   Pulse: 87   Respiration: 18   Pulse Oximetry: 95 %       Physical Exam  Vitals and nursing note reviewed. Exam conducted with a chaperone present.   Constitutional:       Appearance: Normal appearance. He is well-developed. He is obese. He is not ill-appearing, toxic-appearing or diaphoretic.   HENT:      Head: Normocephalic  and atraumatic.      Right Ear: Tympanic membrane, ear canal and external ear normal.      Left Ear: Tympanic membrane, ear canal and external ear normal.      Nose: Nose normal. No congestion or rhinorrhea.      Mouth/Throat:      Mouth: Mucous membranes are moist.      Pharynx: Oropharynx is clear. No oropharyngeal exudate or posterior oropharyngeal erythema.   Eyes:      General:         Right eye: No discharge.         Left eye: No discharge.      Extraocular Movements: Extraocular movements intact.      Conjunctiva/sclera: Conjunctivae normal.      Pupils: Pupils are equal, round, and reactive to light.   Neck:      Thyroid: No thyroid mass.      Vascular: No carotid bruit or JVD.   Cardiovascular:      Rate and Rhythm: Normal rate and regular rhythm.      Pulses: Normal pulses.      Heart sounds: Normal heart sounds, S1 normal and S2 normal.   Pulmonary:      Effort: Pulmonary effort is normal.      Breath sounds: Normal breath sounds and air entry.   Abdominal:      General: Abdomen is flat. Bowel sounds are normal.      Palpations: Abdomen is soft.   Musculoskeletal:         General: Normal range of motion.      Cervical back: Normal range of motion and neck supple. No edema or rigidity.      Right lower leg: No edema.      Left lower leg: No edema.      Right foot: Normal range of motion. No deformity or foot drop.      Left foot: Normal range of motion. No deformity or foot drop.   Feet:      Right foot:      Skin integrity: Skin integrity normal. No ulcer.      Left foot:      Skin integrity: Skin integrity normal. No ulcer.   Lymphadenopathy:      Head:      Right side of head: No submental adenopathy.      Left side of head: No submental adenopathy.      Cervical: No cervical adenopathy.      Right cervical: No superficial cervical adenopathy.     Left cervical: No superficial cervical adenopathy.      Upper Body:      Right upper body: No supraclavicular adenopathy.      Left upper body: No  supraclavicular adenopathy.      Lower Body: No right inguinal adenopathy. No left inguinal adenopathy.   Skin:     General: Skin is warm and dry.      Capillary Refill: Capillary refill takes less than 2 seconds.      Findings: No abrasion or wound.   Neurological:      General: No focal deficit present.      Mental Status: He is alert and oriented to person, place, and time. Mental status is at baseline.      GCS: GCS eye subscore is 4. GCS verbal subscore is 5. GCS motor subscore is 6.      Sensory: Sensation is intact.      Motor: Motor function is intact. No weakness.      Coordination: Coordination is intact.   Psychiatric:         Mood and Affect: Mood and affect normal.         Speech: Speech normal.         Behavior: Behavior normal. Behavior is cooperative.         Thought Content: Thought content normal.         Judgment: Judgment normal.       Laboratory:  Recent Labs     04/24/23  1530   WBC 5.6   RBC 5.03   HEMOGLOBIN 17.0   HEMATOCRIT 47.6   MCV 94.6   MCH 33.8*   MCHC 35.7*   RDW 40.4   PLATELETCT 246   MPV 9.2     Recent Labs     04/24/23  1530   SODIUM 143   POTASSIUM 3.8   CHLORIDE 105   CO2 23   GLUCOSE 93   BUN 9   CREATININE 0.91   CALCIUM 10.1     Recent Labs     04/24/23  1530   ALTSGPT 35   ASTSGOT 30   ALKPHOSPHAT 81   TBILIRUBIN 1.7*   LIPASE 23   GLUCOSE 93     Recent Labs     04/24/23  1530   APTT 27.3   INR 1.04     No results for input(s): NTPROBNP in the last 72 hours.      Recent Labs     04/24/23  1530   TROPONINT <6       Imaging:  CT-CTA NECK WITH & W/O-POST PROCESSING   Final Result      CT angiogram of the neck within normal limits.      CT-CTA HEAD WITH & W/O-POST PROCESS   Final Result      CT angiogram of the [place] within normal limits.      CT-CEREBRAL PERFUSION ANALYSIS   Final Result      1.  Cerebral blood flow less than 30% likely representing completed infarct = 0 mL.      2.  T Max more than 6 seconds likely representing combination of completed infarct and  ischemia = 0 mL.      3.  Mismatched volume likely representing ischemic brain/penumbra = None      4.  Please note that the cerebral perfusion was performed on the limited brain tissue around the basal ganglia region. Infarct/ischemia outside the CT perfusion sections can be missed in this study.      DX-CHEST-PORTABLE (1 VIEW)   Final Result      No radiographic evidence of acute cardiopulmonary process.      CT-HEAD W/O   Final Result      Head CT without contrast within normal limits. No evidence of acute cerebral infarction, hemorrhage or mass lesion.         MR-BRAIN-W/O    (Results Pending)   EC-ECHOCARDIOGRAM COMPLETE W/ CONT    (Results Pending)   US-RUQ    (Results Pending)       X-Ray:  I have personally reviewed the images and compared with prior images.  EKG:  I have personally reviewed the images and compared with prior images.    Assessment/Plan:  Justification for Admission Status  I anticipate this patient is appropriate for observation status at this time because patient had a TIA event lasting about 2 hours.  He did not require tPA.  The patient at this point will be evaluated with imaging studies and will spend overnight admission in the hospital for evaluation and neurological monitoring.    Patient will need a Telemetry bed on MEDICAL service .  The need is secondary to TIA.    * TIA (transient ischemic attack)- (present on admission)  Assessment & Plan  Patient today around 2:00 while driving his kids home from school suddenly had numbness in the left upper arm and left lower leg.  The patient came right to the emergency room to be evaluated.  By 4:00 the symptoms were totally back to normal and his NIH scale at this point in time is 0.  The patient was referred to neurology who at this point does not recommend tPA  Patient will be admitted overnight for MRI of the head as well as an echocardiogram and initiation of aspirin and statin.    Hyperbilirubinemia  Assessment & Plan  Ultrasound of  the liver and biliary tree ordered  Other liver function tests are within normal limits  Patient does occasionally drink however he has not been drinking excessively.  Possible Gilbert's syndrome    Obesity (BMI 30.0-34.9)  Assessment & Plan  Body mass index is 33.95 kg/m².  Outpatient weight loss management program and lifestyle modification highly recommended        VTE prophylaxis: SCDs/TEDs

## 2023-04-25 NOTE — CARE PLAN
The patient is Stable - Low risk of patient condition declining or worsening    Shift Goals  Clinical Goals: echo and mri in am  Patient Goals: get better, sleep    Progress made toward(s) clinical / shift goals:    Problem: Knowledge Deficit - Standard  Goal: Patient and family/care givers will demonstrate understanding of plan of care, disease process/condition, diagnostic tests and medications  Outcome: Progressing       Patient is not progressing towards the following goals:

## 2023-04-25 NOTE — DISCHARGE PLANNING
Met with pt and he reports being independent with ADLs and IADLs. He does not use any DMEs.     He has no PCP and no insurance on facesheet so he may need to follow up with [facility] or [facility].    Care Transition Team Assessment    Information Source  Orientation Level: Oriented X4  Information Given By: Patient  Who is responsible for making decisions for patient? : Patient    Readmission Evaluation  Is this a readmission?: No    Elopement Risk  Legal Hold: No  Ambulatory or Self Mobile in Wheelchair: Yes  Disoriented: No  Psychiatric Symptoms: None  History of Wandering: No  Elopement this Admit: No  Vocalizing Wanting to Leave: No  Displays Behaviors, Body Language Wanting to Leave: No-Not at Risk for Elopement    Interdisciplinary Discharge Planning  Does Admitting Nurse Feel This Could be a Complex Discharge?: No  Primary Care Physician: None-no insurance  Lives with - Patient's Self Care Capacity: Alone and Able to Care For Self  Patient or legal guardian wants to designate a caregiver: No  Support Systems: Parent  Housing / Facility: [address]  Do You Take your Prescribed Medications Regularly: No  Able to Return to Previous ADL's: Yes  Mobility Issues: No  Prior Services: None, Home-Independent  Patient Prefers to be Discharged to:: Home  Assistance Needed: No  Durable Medical Equipment: Not Applicable    Discharge Preparedness  What is your plan after discharge?: Home with help  What are your discharge supports?: Spouse  Prior Functional Level: Ambulatory, Drives Self, Independent with Activities of Daily Living, Independent with Medication Management  Difficulity with ADLs: None  Difficulity with IADLs: None    Functional Assesment  Prior Functional Level: Ambulatory, Drives Self, Independent with Activities of Daily Living, Independent with Medication Management    Finances  Financial Barriers to Discharge: No  Prescription Coverage: Yes    Vision / Hearing Impairment  Vision Impairment : Yes  Right Eye  Vision: Wears Contacts  Left Eye Vision: Wears Contacts  Hearing Impairment : No    Values / Beliefs / Concerns  Values / Beliefs Concerns : No    Advance Directive  Advance Directive?: None    Domestic Abuse  Have you ever been the victim of abuse or violence?: No  Physical Abuse or Sexual Abuse: No  Verbal Abuse or Emotional Abuse: No  Possible Abuse/Neglect Reported to:: Not Applicable         Discharge Risks or Barriers  Discharge risks or barriers?: No  Patient risk factors: Other (comment)    Anticipated Discharge Information  Discharge Disposition: Discharged to home/self care (01)

## 2023-04-25 NOTE — PROGRESS NOTES
4 Eyes Skin Assessment Completed by ADOLFO Ballesteros and ADOLFO Hidalgo.    Head WDL  Ears WDL  Nose WDL  Mouth WDL  Neck WDL  Breast/Chest WDL  Shoulder Blades WDL  Spine WDL  (R) Arm/Elbow/Hand WDL  (L) Arm/Elbow/Hand WDL  Abdomen WDL  Groin WDL  Scrotum/Coccyx/Buttocks WDL  (R) Leg WDL  (L) Leg WDL  (R) Heel/Foot/Toe WDL  (L) Heel/Foot/Toe WDL          Devices In Places Tele Box      Interventions In Place Pillows and Pressure Redistribution Mattress    Possible Skin Injury No    Pictures Uploaded Into Epic N/A  Wound Consult Placed N/A  RN Wound Prevention Protocol Ordered No

## 2023-04-25 NOTE — PROGRESS NOTES
Telemetry Shift Summary     Rhythm SR, SA  HR Range 68-96   Ectopy rPVC  Measurements  0.16/0.10/0.38           Normal Values  Rhythm SR  HR Range    Measurements 0.12-0.20 / 0.06-0.10  / 0.30-0.52

## 2023-04-25 NOTE — ASSESSMENT & PLAN NOTE
Ultrasound of the liver and biliary tree ordered  Other liver function tests are within normal limits  Patient does occasionally drink however he has not been drinking excessively.  Possible Gilbert's syndrome

## 2023-04-25 NOTE — PROGRESS NOTES
Pt arrives to unit from ER via gurney. Ambulated from gurney to bed, accompanied by ED staff. PT A&Ox4. Tele monitor applied, vitals taken. History, allergies and med rec reviewed and admission profile completed.     Pt oriented to unit and POC discussed, including MRI, echo, monitoring neuro status. Welcome folder provided and discussed. Communication board filled out. Pt instructed to call light usage and encouraged to call for any questions, needs, or concerns and prior to getting out of bed. Fall precautions in place. Pt agrees with the plan and declines any needs at this time. Bed locked and low and bed alarm on.

## 2023-04-25 NOTE — ASSESSMENT & PLAN NOTE
Patient today around 2:00 while driving his kids home from school suddenly had numbness in the left upper arm and left lower leg.  The patient came right to the emergency room to be evaluated.  By 4:00 the symptoms were totally back to normal and his NIH scale at this point in time is 0.  The patient was referred to neurology who at this point does not recommend tPA  Patient will be admitted overnight for MRI of the head as well as an echocardiogram and initiation of aspirin and statin.

## 2023-04-25 NOTE — ASSESSMENT & PLAN NOTE
Body mass index is 33.95 kg/m².  Outpatient weight loss management program and lifestyle modification highly recommended

## 2023-05-03 ENCOUNTER — TELEPHONE (OUTPATIENT)
Dept: CARDIOLOGY | Facility: MEDICAL CENTER | Age: 33
End: 2023-05-03

## 2023-05-03 NOTE — TELEPHONE ENCOUNTER
ADD    Caller: Sveta George    Date and time of urgent report:   5/3/23  12:25    Device fell off after 6 days, they are mailing back.     Reference Number: [id number]    Callback Number: [phone]    Route to [facility] TYSON     Thank you,   Liz ARAUJO

## 2023-05-11 ENCOUNTER — TELEPHONE (OUTPATIENT)
Dept: CARDIOLOGY | Facility: MEDICAL CENTER | Age: 33
End: 2023-05-11

## 2023-07-10 ENCOUNTER — TELEPHONE (OUTPATIENT)
Dept: HEALTH INFORMATION MANAGEMENT | Facility: OTHER | Age: 33
End: 2023-07-10

## 2023-08-01 ENCOUNTER — HOSPITAL ENCOUNTER (EMERGENCY)
Facility: MEDICAL CENTER | Age: 33
End: 2023-08-03
Attending: STUDENT IN AN ORGANIZED HEALTH CARE EDUCATION/TRAINING PROGRAM

## 2023-08-01 DIAGNOSIS — F10.939 ALCOHOL WITHDRAWAL SYNDROME WITH COMPLICATION (HCC): ICD-10-CM

## 2023-08-01 DIAGNOSIS — F10.920 ALCOHOLIC INTOXICATION WITHOUT COMPLICATION (HCC): ICD-10-CM

## 2023-08-01 DIAGNOSIS — R45.851 SUICIDAL IDEATION: ICD-10-CM

## 2023-08-01 LAB
AMPHET UR QL SCN: NEGATIVE
ANION GAP SERPL CALC-SCNC: 20 MMOL/L (ref 7–16)
APAP SERPL-MCNC: <5 UG/ML (ref 10–30)
BARBITURATES UR QL SCN: NEGATIVE
BENZODIAZ UR QL SCN: NEGATIVE
BUN SERPL-MCNC: 12 MG/DL (ref 8–22)
BZE UR QL SCN: NEGATIVE
CALCIUM SERPL-MCNC: 10.4 MG/DL (ref 8.5–10.5)
CANNABINOIDS UR QL SCN: NEGATIVE
CHLORIDE SERPL-SCNC: 104 MMOL/L (ref 96–112)
CO2 SERPL-SCNC: 20 MMOL/L (ref 20–33)
CREAT SERPL-MCNC: 0.9 MG/DL (ref 0.5–1.4)
EKG IMPRESSION: NORMAL
FENTANYL UR QL: NEGATIVE
GFR SERPLBLD CREATININE-BSD FMLA CKD-EPI: 114 ML/MIN/1.73 M 2
GLUCOSE SERPL-MCNC: 91 MG/DL (ref 65–99)
METHADONE UR QL SCN: NEGATIVE
OPIATES UR QL SCN: NEGATIVE
OXYCODONE UR QL SCN: NEGATIVE
PCP UR QL SCN: NEGATIVE
POC BREATHALIZER: 0.15 PERCENT (ref 0–0.01)
POC BREATHALIZER: 0.22 PERCENT (ref 0–0.01)
POTASSIUM SERPL-SCNC: 4.4 MMOL/L (ref 3.6–5.5)
PROPOXYPH UR QL SCN: NEGATIVE
SALICYLATES SERPL-MCNC: <1 MG/DL (ref 15–25)
SODIUM SERPL-SCNC: 144 MMOL/L (ref 135–145)

## 2023-08-01 PROCEDURE — 302970 POC BREATHALIZER: Performed by: STUDENT IN AN ORGANIZED HEALTH CARE EDUCATION/TRAINING PROGRAM

## 2023-08-01 PROCEDURE — A9270 NON-COVERED ITEM OR SERVICE: HCPCS | Performed by: STUDENT IN AN ORGANIZED HEALTH CARE EDUCATION/TRAINING PROGRAM

## 2023-08-01 PROCEDURE — 80179 DRUG ASSAY SALICYLATE: CPT

## 2023-08-01 PROCEDURE — 80048 BASIC METABOLIC PNL TOTAL CA: CPT

## 2023-08-01 PROCEDURE — 80307 DRUG TEST PRSMV CHEM ANLYZR: CPT

## 2023-08-01 PROCEDURE — 99285 EMERGENCY DEPT VISIT HI MDM: CPT

## 2023-08-01 PROCEDURE — 80143 DRUG ASSAY ACETAMINOPHEN: CPT

## 2023-08-01 PROCEDURE — 90791 PSYCH DIAGNOSTIC EVALUATION: CPT

## 2023-08-01 PROCEDURE — 700102 HCHG RX REV CODE 250 W/ 637 OVERRIDE(OP): Performed by: STUDENT IN AN ORGANIZED HEALTH CARE EDUCATION/TRAINING PROGRAM

## 2023-08-01 PROCEDURE — 36415 COLL VENOUS BLD VENIPUNCTURE: CPT

## 2023-08-01 PROCEDURE — 93005 ELECTROCARDIOGRAM TRACING: CPT | Performed by: STUDENT IN AN ORGANIZED HEALTH CARE EDUCATION/TRAINING PROGRAM

## 2023-08-01 PROCEDURE — 302970 POC BREATHALIZER

## 2023-08-01 PROCEDURE — 700105 HCHG RX REV CODE 258: Performed by: STUDENT IN AN ORGANIZED HEALTH CARE EDUCATION/TRAINING PROGRAM

## 2023-08-01 RX ORDER — DEXTROSE AND SODIUM CHLORIDE 5; .9 G/100ML; G/100ML
INJECTION, SOLUTION INTRAVENOUS ONCE
Status: COMPLETED | OUTPATIENT
Start: 2023-08-01 | End: 2023-08-01

## 2023-08-01 RX ORDER — GAUZE BANDAGE 2" X 2"
100 BANDAGE TOPICAL DAILY
Status: DISCONTINUED | OUTPATIENT
Start: 2023-08-01 | End: 2023-08-03 | Stop reason: HOSPADM

## 2023-08-01 RX ORDER — LORAZEPAM 2 MG/ML
1 INJECTION INTRAMUSCULAR
Status: DISCONTINUED | OUTPATIENT
Start: 2023-08-01 | End: 2023-08-03 | Stop reason: HOSPADM

## 2023-08-01 RX ORDER — LORAZEPAM 1 MG/1
1 TABLET ORAL EVERY 4 HOURS PRN
Status: DISCONTINUED | OUTPATIENT
Start: 2023-08-01 | End: 2023-08-03 | Stop reason: HOSPADM

## 2023-08-01 RX ORDER — FOLIC ACID 1 MG/1
1 TABLET ORAL DAILY
Status: DISCONTINUED | OUTPATIENT
Start: 2023-08-01 | End: 2023-08-03 | Stop reason: HOSPADM

## 2023-08-01 RX ORDER — LORAZEPAM 2 MG/ML
1.5 INJECTION INTRAMUSCULAR
Status: DISCONTINUED | OUTPATIENT
Start: 2023-08-01 | End: 2023-08-03 | Stop reason: HOSPADM

## 2023-08-01 RX ORDER — LORAZEPAM 1 MG/1
3 TABLET ORAL
Status: DISCONTINUED | OUTPATIENT
Start: 2023-08-01 | End: 2023-08-03 | Stop reason: HOSPADM

## 2023-08-01 RX ORDER — LORAZEPAM 1 MG/1
0.5 TABLET ORAL EVERY 4 HOURS PRN
Status: DISCONTINUED | OUTPATIENT
Start: 2023-08-01 | End: 2023-08-03 | Stop reason: HOSPADM

## 2023-08-01 RX ORDER — LORAZEPAM 1 MG/1
2 TABLET ORAL
Status: DISCONTINUED | OUTPATIENT
Start: 2023-08-01 | End: 2023-08-03 | Stop reason: HOSPADM

## 2023-08-01 RX ORDER — LORAZEPAM 1 MG/1
4 TABLET ORAL
Status: DISCONTINUED | OUTPATIENT
Start: 2023-08-01 | End: 2023-08-03 | Stop reason: HOSPADM

## 2023-08-01 RX ORDER — LORAZEPAM 2 MG/ML
0.5 INJECTION INTRAMUSCULAR EVERY 4 HOURS PRN
Status: DISCONTINUED | OUTPATIENT
Start: 2023-08-01 | End: 2023-08-03 | Stop reason: HOSPADM

## 2023-08-01 RX ORDER — ACETAMINOPHEN 500 MG
500-1000 TABLET ORAL EVERY 6 HOURS PRN
COMMUNITY

## 2023-08-01 RX ORDER — SODIUM CHLORIDE 9 MG/ML
1000 INJECTION, SOLUTION INTRAVENOUS ONCE
Status: COMPLETED | OUTPATIENT
Start: 2023-08-01 | End: 2023-08-01

## 2023-08-01 RX ORDER — LORAZEPAM 2 MG/ML
2 INJECTION INTRAMUSCULAR
Status: DISCONTINUED | OUTPATIENT
Start: 2023-08-01 | End: 2023-08-03 | Stop reason: HOSPADM

## 2023-08-01 RX ADMIN — DEXTROSE AND SODIUM CHLORIDE: 5; 900 INJECTION, SOLUTION INTRAVENOUS at 05:44

## 2023-08-01 RX ADMIN — THERA TABS 1 TABLET: TAB at 05:46

## 2023-08-01 RX ADMIN — LORAZEPAM 1 MG: 1 TABLET ORAL at 19:41

## 2023-08-01 RX ADMIN — FOLIC ACID 1 MG: 1 TABLET ORAL at 05:46

## 2023-08-01 RX ADMIN — SODIUM CHLORIDE 1000 ML: 9 INJECTION, SOLUTION INTRAVENOUS at 02:32

## 2023-08-01 RX ADMIN — Medication 100 MG: at 05:46

## 2023-08-01 RX ADMIN — LORAZEPAM 0.5 MG: 1 TABLET ORAL at 13:16

## 2023-08-01 ASSESSMENT — LIFESTYLE VARIABLES
VISUAL DISTURBANCES: NOT PRESENT
PAROXYSMAL SWEATS: BARELY PERCEPTIBLE SWEATING. PALMS MOIST
ANXIETY: MILDLY ANXIOUS
TREMOR: TREMOR NOT VISIBLE BUT CAN BE FELT, FINGERTIP TO FINGERTIP
AUDITORY DISTURBANCES: NOT PRESENT
VISUAL DISTURBANCES: NOT PRESENT
NAUSEA AND VOMITING: NO NAUSEA AND NO VOMITING
TREMOR: NO TREMOR
AGITATION: NORMAL ACTIVITY
ANXIETY: MILDLY ANXIOUS
AGITATION: NORMAL ACTIVITY
VISUAL DISTURBANCES: NOT PRESENT
NAUSEA AND VOMITING: NO NAUSEA AND NO VOMITING
AUDITORY DISTURBANCES: NOT PRESENT
AUDITORY DISTURBANCES: NOT PRESENT
ORIENTATION AND CLOUDING OF SENSORIUM: ORIENTED AND CAN DO SERIAL ADDITIONS
NAUSEA AND VOMITING: NO NAUSEA AND NO VOMITING
ORIENTATION AND CLOUDING OF SENSORIUM: ORIENTED AND CAN DO SERIAL ADDITIONS
HEADACHE, FULLNESS IN HEAD: NOT PRESENT
ORIENTATION AND CLOUDING OF SENSORIUM: ORIENTED AND CAN DO SERIAL ADDITIONS
HEADACHE, FULLNESS IN HEAD: NOT PRESENT
NAUSEA AND VOMITING: NO NAUSEA AND NO VOMITING
TREMOR: TREMOR NOT VISIBLE BUT CAN BE FELT, FINGERTIP TO FINGERTIP
ANXIETY: NO ANXIETY (AT EASE)
TOTAL SCORE: 2
ORIENTATION AND CLOUDING OF SENSORIUM: ORIENTED AND CAN DO SERIAL ADDITIONS
AUDITORY DISTURBANCES: NOT PRESENT
HEADACHE, FULLNESS IN HEAD: NOT PRESENT
HEADACHE, FULLNESS IN HEAD: NOT PRESENT
ANXIETY: NO ANXIETY (AT EASE)
TREMOR: TREMOR NOT VISIBLE BUT CAN BE FELT, FINGERTIP TO FINGERTIP
TOTAL SCORE: 2
HEADACHE, FULLNESS IN HEAD: NOT PRESENT
TREMOR: MODERATE TREMOR WITH ARMS EXTENDED
NAUSEA AND VOMITING: NO NAUSEA AND NO VOMITING
AGITATION: NORMAL ACTIVITY
AGITATION: NORMAL ACTIVITY
TOTAL SCORE: 3
ANXIETY: NO ANXIETY (AT EASE)
PAROXYSMAL SWEATS: BARELY PERCEPTIBLE SWEATING. PALMS MOIST
AGITATION: NORMAL ACTIVITY
HEADACHE, FULLNESS IN HEAD: NOT PRESENT
ORIENTATION AND CLOUDING OF SENSORIUM: ORIENTED AND CAN DO SERIAL ADDITIONS
TOTAL SCORE: 1
AUDITORY DISTURBANCES: NOT PRESENT
AGITATION: NORMAL ACTIVITY
PAROXYSMAL SWEATS: BARELY PERCEPTIBLE SWEATING. PALMS MOIST
ANXIETY: MILDLY ANXIOUS
ORIENTATION AND CLOUDING OF SENSORIUM: ORIENTED AND CAN DO SERIAL ADDITIONS
VISUAL DISTURBANCES: NOT PRESENT
NAUSEA AND VOMITING: NO NAUSEA AND NO VOMITING
TOTAL SCORE: 6
PAROXYSMAL SWEATS: BARELY PERCEPTIBLE SWEATING. PALMS MOIST
VISUAL DISTURBANCES: NOT PRESENT
TOTAL SCORE: 9
AUDITORY DISTURBANCES: NOT PRESENT
PAROXYSMAL SWEATS: BARELY PERCEPTIBLE SWEATING. PALMS MOIST
TREMOR: MODERATE TREMOR WITH ARMS EXTENDED
PAROXYSMAL SWEATS: BEADS OF SWEAT OBVIOUS ON FOREHEAD
VISUAL DISTURBANCES: NOT PRESENT

## 2023-08-01 ASSESSMENT — FIBROSIS 4 INDEX: FIB4 SCORE: 0.94

## 2023-08-01 NOTE — ED PROVIDER NOTES
ED Provider Note    CHIEF COMPLAINT  Chief Complaint   Patient presents with    Suicidal Ideation     Pt said that he has suicidal ideation today with the plan to use gun to harm himself  2 days ago he also had SI plan to drown himself in a river  Pt has this SI within 6 months  Alert and Oriented x 4  Pt had drink tequilla today       EXTERNAL RECORDS REVIEWED  Inpatient Notes record review from 4/25/2020 patient was admitted to the hospital the day prior with chest pain and shortness of breath he was diagnosed with a TIA and hyperbilirubinemia of unclear etiology.    HPI/ROS  LIMITATION TO HISTORY   Select: : None      Adria Chacon is a 32 y.o. male who presents to the emergency department for suicidal ideation.  Patient reports to me that he has had increasing frequency of suicidal ideation over the past 6 months due to multiple life stressors stemming from posttraumatic stress disorder.  He states that tonight he drank a bottle of tequila, drove to a remote location with a gun and was planning to shoot himself with this gun but then thought about his daughter and the fact that this decision would have on her, called his ex-wife who then called EMS to transport the patient to the hospital.    PAST MEDICAL HISTORY   has a past medical history of Psychiatric disorder.    SURGICAL HISTORY   has a past surgical history that includes other and appendectomy.    FAMILY HISTORY  History reviewed. No pertinent family history.    SOCIAL HISTORY  Social History     Tobacco Use    Smoking status: Former     Years: 3.00     Types: Cigarettes    Smokeless tobacco: Never   Substance and Sexual Activity    Alcohol use: Yes    Drug use: Never    Sexual activity: Not on file       CURRENT MEDICATIONS  Home Medications       Reviewed by Michael Blancas R.N. (Registered Nurse) on 08/01/23 at 0037  Med List Status: Partial     Medication Last Dose Status   aspirin EC (ECOTRIN) 81 MG Tablet Delayed Response  Active   metoprolol  "SR (TOPROL XL) 25 MG TABLET SR 24 HR  Active                    ALLERGIES  Allergies   Allergen Reactions    Raspberry Anaphylaxis    Diprivan      Pt reports that he was asleep for 2 hours, not able to wake up       PHYSICAL EXAM  VITAL SIGNS: /88   Pulse (!) 102   Temp 36.2 °C (97.2 °F) (Temporal)   Resp 18   Ht 1.803 m (5' 11\")   Wt 109 kg (240 lb)   SpO2 94%   BMI 33.47 kg/m²    Constitutional: Tearful.  HENT: Normocephalic, Atraumatic, Bilateral external ears normal. Nose normal.   Eyes: Pupils are equal and reactive. Conjunctiva normal, non-icteric.   Heart: Tachycardic rate, regular rhythm  Lungs: No respiratory distress  GI: Soft nontender nondistended   Musculoskeletal: No obvious deformity. No leg edema.  Skin: Warm, Dry, No erythema, No rash.   Neurologic: Alert and oriented x 3, Cranial nerves III through XII grossly intact no sensory deficit no cerebellar dysfunction.   Psychiatric: Tearful affect, suicidal with a plan      DIAGNOSTIC STUDIES / PROCEDURES  EKG  I have independently interpreted this EKG  Results for orders placed or performed during the hospital encounter of 23   EKG (Now)   Result Value Ref Range    Report       [facility] Emergency Dept.    Test Date:  2023  Pt Name:    KELY CHRISTIAN                   Department: ER  MRN:        [id number]                      Room:        30  Gender:     Male                         Technician: [id number]  :        [id number]                   Requested By:VERONICA LIANG  Order #:    [id number]                    Reading MD: Veronica Liang    Measurements  Intervals                                Axis  Rate:       98                           P:          47  KY:         141                          QRS:        -36  QRSD:       96                           T:          32  QT:         342  QTc:        437    Interpretive Statements  EKG interpretation: Normal sinus rhythm at a rate of 98, normal axis, " normal  intervals, no ST elevation or depression, T wave flattening in lead III.  No  change from prior EKG.  Electronically Signed On 08- 05:08:04 PDT by Keenan Liang           LABS  Labs Reviewed   SALICYLATE - Abnormal; Notable for the following components:       Result Value    Salicylates, Quant. <1.0 (*)     All other components within normal limits   ACETAMINOPHEN - Abnormal; Notable for the following components:    Acetaminophen -Tylenol <5.0 (*)     All other components within normal limits   BASIC METABOLIC PANEL - Abnormal; Notable for the following components:    Anion Gap 20.0 (*)     All other components within normal limits   POC BREATHALIZER - Abnormal; Notable for the following components:    POC Breathalizer 0.216 (*)     All other components within normal limits   URINE DRUG SCREEN   ESTIMATED GFR         COURSE & MEDICAL DECISION MAKING    ED Observation Status? Yes; I am placing the patient in to an observation status due to a diagnostic uncertainty as well as therapeutic intensity. Patient placed in observation status at 1:34 AM, 8/1/2023.     Observation plan is as follows: Blood work and observation pending mental health evaluation and likely placement      INITIAL ASSESSMENT, COURSE AND PLAN  Care Narrative:     Patient presenting to the emergency department brought in by EMS for evaluation of suicidal ideation in setting of alcohol use.  Patient with history of daily alcohol use disorder.  Arrives tachycardic and mildly tremulous which I suspect is evidence of early alcohol withdrawal syndrome.  Highly concerning story for suicidal ideation.  Screening toxic ingestion labs sent and normal at this time.  Behavioral health consulted and Cristy evaluated the patient and agrees with my assessment that patient will require inpatient admission.  Patient be placed on a legal hold pending this.  He has no evidence of additional self-harm or traumatic injury at this time.  Work-up today is  remarkable for an anion gap metabolic acidosis which I suspect is secondary to starvation ketosis versus alcoholic ketoacidosis.  Will treat with IV dextrose infusion and provide thiamine.  Patient placed in observation status pending mental health evaluation and placement.    At this point patient is continued in ED observation status pending transfer to an inpatient psychiatric facility.  Patient be signed out to oncoming ED provider pending this.    HYDRATION: Based on the patient's presentation of Tachycardia the patient was given IV fluids. IV Hydration was used because oral hydration was not as rapid as required. Upon recheck following hydration, the patient was normalization of heart rate.      ADDITIONAL PROBLEM LIST  Alcohol abuse disorder, anion gap metabolic acidosis, PTSD    DISPOSITION AND DISCUSSIONS  I have discussed management of the patient with the following physicians and IRENE's:  None    Discussion of management with other QHP or appropriate source(s): Social Work Cristy        FINAL DIAGNOSIS  1. Suicidal ideation    2. Alcoholic intoxication without complication (HCC)    3. Alcohol withdrawal syndrome with complication (HCC)           Electronically signed by: Keenan Liang M.D., 8/1/2023 1:07 AM

## 2023-08-01 NOTE — ED NOTES
Med Rec complete per patient   Allergies reviewed  Preferred Pharmacy: [facility] on [address]  Patient denies any prescription medications in the last 30 days

## 2023-08-01 NOTE — ED NOTES
Patient's home medications have been reviewed by the pharmacy team.     Patient's Medications   New Prescriptions    No medications on file   Previous Medications    ACETAMINOPHEN (TYLENOL) 500 MG TAB    Take 500-1,000 mg by mouth every 6 hours as needed for Mild Pain.   Modified Medications    No medications on file   Discontinued Medications    ASPIRIN EC (ECOTRIN) 81 MG TABLET DELAYED RESPONSE    Take 1 Tablet by mouth every day.    METOPROLOL SR (TOPROL XL) 25 MG TABLET SR 24 HR    Take 0.5 Tablets by mouth every day.         A:  Medications do not appear to be contributing to current complaints.   Hypertensive.      P:    No recommendations at this time. Treatment for etoh withdrawal initiated.     Yuan Milligan, PharmD

## 2023-08-01 NOTE — ED NOTES
Patient is resting comfortably. Breathing steady and unlabored with 1:1 safety sitter outside of room for observation and safety.

## 2023-08-01 NOTE — ED NOTES
Pt. Woke and walked to bathroom with steady gait.  Pt. Back to room and provided with water.  Denies any other needs at this time.  VS updated.  1:1 remains in direct line of sight.

## 2023-08-01 NOTE — ED NOTES
Pt. Continues resting on gurney with eyes closed.  Respirations visible and non-labored.  1:1 sitter remains in direct view.

## 2023-08-01 NOTE — ED NOTES
Patient is resting comfortably, sitting up in bed. Breathing steady and unlabored with 1:1 safety sitter outside of room for observation and safety.

## 2023-08-01 NOTE — CONSULTS
[facility]   TRIAGE ASSESSMENT    Name: Adria Chacon  MRN: [id number]  : 1990  Age: 32 y.o.  Date of assessment: 2023  PCP: Pcp Pt States None  Persons in attendance: Patient  Patient Location: [facility]    CHIEF COMPLAINT/PRESENTING ISSUE (as stated by patient): Pt presented to the ED with BAL of 0.211 at 0044.  He was clinically sober at time of assessment and is continuing to endorse suicidal ideation. He states that he was driving impaired last night and with intent to end his life with a gun. He states at the last minute, he remembered his 12yo daughter and sought help instead. His guns have been secured by his mother. He drinks a fifth or more of tequila nightly and has been doing so for the better part of 10 years. Longest period of sobriety was one month while pt was engaged in trauma therapy related to his job.  He is feeling depressed, hopeless and helpless about his inability to stay sober. He is unable to contract for safety at this time. He will benefit from further evaluation and stabilization in an inpatient setting. No active insurance plan. Referral sent to [facility] for consideration.  Chief Complaint   Patient presents with    Suicidal Ideation     Pt said that he has suicidal ideation today with the plan to use gun to harm himself  2 days ago he also had SI plan to drown himself in a river  Pt has this SI within 6 months  Alert and Oriented x 4  Pt had drink tequilla today        CURRENT LIVING SITUATION/SOCIAL SUPPORT/FINANCIAL RESOURCES: Lives with his mother. Employed full time as a .     BEHAVIORAL HEALTH/SUBSTANCE USE TREATMENT HISTORY  Does patient/parent report a history of prior behavioral health/substance use treatment for patient?   Participated in a month-long trauma therapy program following a back injury at his previous job.    SAFETY ASSESSMENT - SELF  Does patient acknowledge current or past symptoms of dangerousness to  self or is previous history noted? yes  Does parent/significant other report patient has current or past symptoms of dangerousness to self? N\A  Does presenting problem suggest symptoms of dangerousness to self? Yes:     Past Current    Suicidal Thoughts: [x]  [x]    Suicidal Plans: [x]  [x]    Suicidal Intent: [x]  [x]    Suicide Attempts: []  []    Self-Injury []  []      For any boxes checked above, provide detail: SI for six months. Has planned to drown himself in a river or use a gun.    History of suicide by family member: no  History of suicide by friend/significant other: no  Recent change in frequency/specificity/intensity of suicidal thoughts or self-harm behavior? Yes, x6 months  Current access to firearms, medications, or other identified means of suicide/self-harm? yes  If yes, willing to restrict access to means of suicide/self-harm? yes - Mother has secured all weapons  Protective factors present:  Strong family connections and Willing to address in treatment    SAFETY ASSESSMENT - OTHERS  Does patient acknowledge current or past symptoms of aggressive behavior or risk to others or is previous history noted? no  Does parent/significant other report patient has current or past symptoms of aggressive behavior or risk to others?  N\A  Does presenting problem suggest symptoms of dangerousness to others? No    LEGAL HISTORY  Does patient acknowledge history of arrest/detention/senior care or is previous history noted? no    Crisis Safety Plan completed and copy given to patient? N\A    ABUSE/NEGLECT SCREEN  Does patient report feeling “unsafe” in his/her home, or afraid of anyone?  no  Does patient report any history of physical, sexual, or emotional abuse?  no  Does parent or significant other report any of the above? N\A  Is there evidence of neglect by self?  no  Is there evidence of neglect by a caregiver? no  Does the patient/parent report any history of CPS/APS/police involvement related to suspected  "abuse/neglect or domestic violence? no  Based on the information provided during the current assessment, is a mandated report of suspected abuse/neglect being made?  No    SUBSTANCE USE SCREENING  Yes:  Frank all substances used in the past 30 days:      Last Use Amount   [x]   Alcohol 7/31/23  Fifth of tequila   []   Marijuana     []   Heroin     []   Prescription Opioids  (used without prescription, for    recreation, or in excess of prescribed amount)     []   Other Prescription  (used without prescription, for    recreation, or in excess of prescribed amount)     []   Cocaine      []   Methamphetamine     []   \"\" drugs (ectasy, MDMA)     []   Other substances        UDS results: negative for all substances  Breathalyzer results: 0.211 at time of admission    What consequences does the patient associate with any of the above substance use and or addictive behaviors? Other: Loss of relationships, family problems, financial problems    Risk factors for detox (check all that apply):  [x]  Seizures   [x]  Diaphoretic (sweating)   [x]  Tremors   [x]  Hallucinations   [x]  Increased blood pressure   []  Decreased blood pressure   []  Other   []  None      [] Patient education on risk factors for detoxification and instructed to return to ER as needed.      MENTAL STATUS   Participation: Active verbal participation, Engaged, and Open to feedback  Grooming: Casual  Orientation: Alert and Fully Oriented  Behavior: Calm  Eye contact: Good  Mood: Depressed and Anxious  Affect: Tearful  Thought process: Logical and Goal-directed  Thought content: Within normal limits  Speech: Rate within normal limits and Soft  Perception: Within normal limits  Memory:  No gross evidence of memory deficits  Insight: Limited  Judgment:  Limited  Other:    Collateral information:    Source:  [] Significant other present in person:   [] Significant other by telephone  [] [facility]   [] [facility] Nursing Staff  [] [facility] " Record  [] Other:     [] Unable to complete full assessment due to:  [] Acute intoxication  [] Patient declined to participate/engage  [] Patient verbally unresponsive  [] Significant cognitive deficits  [] Significant perceptual distortions or behavioral disorganization  [] Other:      CLINICAL IMPRESSIONS:  Primary:  suicidal ideation  Secondary:  alcohol use disorder       IDENTIFIED NEEDS/PLAN:  [Trigger DISPOSITION list for any items marked]    []  Imminent safety risk - self [] Imminent safety risk - others   []  Acute substance withdrawal []  Psychosis/Impaired reality testing   []  Mood/anxiety []  Substance use/Addictive behavior   []  Maladaptive behaviro []  Parent/child conflict   []  Family/Couples conflict []  Biomedical   []  Housing []  Financial   []   Legal  Occupational/Educational   []  Domestic violence []  Other:     Recommended Plan of Care:  Actively being addressed by [facility] and [facility], Refer to [facility], and 1:1 Observation  *Telesitter may not be utilized for moderate or high risk patients    Has the Recommended Plan of Care/Level of Observation been reviewed with the patient's assigned nurse? yes    Does patient/parent or guardian express agreement with the above plan? no    Referral appointment(s) scheduled? N\A    Alert team only:   I have discussed findings and recommendations with Dr Vásquez who is in agreement with these recommendations.     Referral information sent to the following outpatient community providers :    Referral information sent to the following inpatient community providers : [facility]    If applicable : Referred  to  Alert Team for legal hold follow up at (time): 8/4/23      Ernestine Ovalle R.N.  8/1/2023

## 2023-08-01 NOTE — ED NOTES
Woke pt. To offer breakfast tray; pt. Declines at this time but verbalized understanding tray is available.  VS updated.      Breathalyzer redone = 0.124 at this time.      Pt. Denies needs.  1:1 sitter remains in direct line of sight.

## 2023-08-01 NOTE — ED NOTES
.Patient remains comfortable on gurney, no identifiable needs at this time. Equal chest rise and fall bilaterally. Safety measures in place, sitter 1:1 in direct view of pt.

## 2023-08-01 NOTE — ED NOTES
Patient is resting comfortably, asleep. Breathing steady and unlabored with 1:1 safety sitter outside of room for observation and safety.     Discharged

## 2023-08-01 NOTE — ED NOTES
Legal hold initiated per alert team RN, all belongings and equipment taken from room. Security called for belonging check. Safety measures in place. Sitter 1:1 in direct view of pt.

## 2023-08-01 NOTE — ED NOTES
Received bedside report from ADOLFO Fofana to assume care of pt. At this time. Pt. Resting on gurney with eyes closed.  Respirations visible and non-labored.  1:1 sitter outside door.  Room is stripped for safety.

## 2023-08-01 NOTE — ED NOTES
.Patient remains comfortable on gurney, no identifiable needs at this time. Equal chest rise and fall bilaterally. Pt placed on 1L of O2 due to dropping to 90% when sleeping. Safety measures in place, sitter 1:1 in direct view of pt.   Pt medicated per MAR. Educated on medications. Verbalized understanding.      abd pain

## 2023-08-01 NOTE — ED NOTES
Bedside report received from ADOLFO Fritz. Pt resting comfortably in bed with steady and unlabored breathing with 1:1 safety sitter outside of room for observation and safety.

## 2023-08-01 NOTE — ED NOTES
Breathalyzer 0.087 at this time.  Pt. Continues to deny needs.  No distress noted.  Respirations even, non-labored.  1:1 sitter remains in use.

## 2023-08-01 NOTE — ED NOTES
Pt provided lunch tray, Pt sitting up in bed eating. 1:1 safety sitter outside of room for observation and safety.

## 2023-08-01 NOTE — ED TRIAGE NOTES
Chief Complaint   Patient presents with    Suicidal Ideation     Pt said that he has suicidal ideation today with the plan to use gun to harm himself  2 days ago he also had SI plan to drown himself in a river  Pt has this SI within 6 months  Alert and Oriented x 4  Pt had drink tequilla today     Brought by EMS with the above complaints    Vitals:    08/01/23 0034   BP: (!) 154/105   Pulse: (!) 123   Resp: 17   Temp: 36.2 °C (97.2 °F)   SpO2: 94%

## 2023-08-01 NOTE — ED NOTES
Assumed bedside report and patient care from ADOLFO Macias. Patient is resting comfortably in bed with no signs of labored breathing or restlessness. POC discussed. No questions or concerns at this time. Pending ERP. Safety measures in place. Sitter in place, 1:1  Urine sent, POC breathlyzer completed.

## 2023-08-02 PROCEDURE — A9270 NON-COVERED ITEM OR SERVICE: HCPCS | Performed by: STUDENT IN AN ORGANIZED HEALTH CARE EDUCATION/TRAINING PROGRAM

## 2023-08-02 PROCEDURE — 700102 HCHG RX REV CODE 250 W/ 637 OVERRIDE(OP): Performed by: STUDENT IN AN ORGANIZED HEALTH CARE EDUCATION/TRAINING PROGRAM

## 2023-08-02 PROCEDURE — 700102 HCHG RX REV CODE 250 W/ 637 OVERRIDE(OP)

## 2023-08-02 PROCEDURE — A9270 NON-COVERED ITEM OR SERVICE: HCPCS

## 2023-08-02 PROCEDURE — 99285 EMERGENCY DEPT VISIT HI MDM: CPT | Mod: GC | Performed by: PSYCHIATRY & NEUROLOGY

## 2023-08-02 PROCEDURE — 99285 EMERGENCY DEPT VISIT HI MDM: CPT

## 2023-08-02 RX ORDER — FLUOXETINE HYDROCHLORIDE 20 MG/1
20 CAPSULE ORAL DAILY
Status: DISCONTINUED | OUTPATIENT
Start: 2023-08-02 | End: 2023-08-03 | Stop reason: HOSPADM

## 2023-08-02 RX ADMIN — Medication 100 MG: at 06:17

## 2023-08-02 RX ADMIN — THERA TABS 1 TABLET: TAB at 06:17

## 2023-08-02 RX ADMIN — FLUOXETINE 20 MG: 20 CAPSULE ORAL at 14:00

## 2023-08-02 RX ADMIN — FOLIC ACID 1 MG: 1 TABLET ORAL at 06:17

## 2023-08-02 ASSESSMENT — LIFESTYLE VARIABLES
HEADACHE, FULLNESS IN HEAD: NOT PRESENT
ANXIETY: NO ANXIETY (AT EASE)
HEADACHE, FULLNESS IN HEAD: NOT PRESENT
TOTAL SCORE: 1
ORIENTATION AND CLOUDING OF SENSORIUM: ORIENTED AND CAN DO SERIAL ADDITIONS
AUDITORY DISTURBANCES: NOT PRESENT
AUDITORY DISTURBANCES: NOT PRESENT
NAUSEA AND VOMITING: NO NAUSEA AND NO VOMITING
AGITATION: NORMAL ACTIVITY
AGITATION: NORMAL ACTIVITY
ORIENTATION AND CLOUDING OF SENSORIUM: ORIENTED AND CAN DO SERIAL ADDITIONS
ORIENTATION AND CLOUDING OF SENSORIUM: ORIENTED AND CAN DO SERIAL ADDITIONS
VISUAL DISTURBANCES: NOT PRESENT
AGITATION: NORMAL ACTIVITY
AUDITORY DISTURBANCES: NOT PRESENT
PAROXYSMAL SWEATS: NO SWEAT VISIBLE
NAUSEA AND VOMITING: NO NAUSEA AND NO VOMITING
TREMOR: TREMOR NOT VISIBLE BUT CAN BE FELT, FINGERTIP TO FINGERTIP
ANXIETY: NO ANXIETY (AT EASE)
TREMOR: TREMOR NOT VISIBLE BUT CAN BE FELT, FINGERTIP TO FINGERTIP
ANXIETY: NO ANXIETY (AT EASE)
ANXIETY: NO ANXIETY (AT EASE)
PAROXYSMAL SWEATS: NO SWEAT VISIBLE
VISUAL DISTURBANCES: NOT PRESENT
TREMOR: TREMOR NOT VISIBLE BUT CAN BE FELT, FINGERTIP TO FINGERTIP
TOTAL SCORE: 1
NAUSEA AND VOMITING: NO NAUSEA AND NO VOMITING
ORIENTATION AND CLOUDING OF SENSORIUM: ORIENTED AND CAN DO SERIAL ADDITIONS
TOTAL SCORE: 1
VISUAL DISTURBANCES: NOT PRESENT
PAROXYSMAL SWEATS: NO SWEAT VISIBLE
AUDITORY DISTURBANCES: NOT PRESENT
TREMOR: TREMOR NOT VISIBLE BUT CAN BE FELT, FINGERTIP TO FINGERTIP
PAROXYSMAL SWEATS: NO SWEAT VISIBLE
NAUSEA AND VOMITING: NO NAUSEA AND NO VOMITING
AGITATION: NORMAL ACTIVITY
VISUAL DISTURBANCES: NOT PRESENT
HEADACHE, FULLNESS IN HEAD: NOT PRESENT
TOTAL SCORE: 1
HEADACHE, FULLNESS IN HEAD: NOT PRESENT

## 2023-08-02 NOTE — ED NOTES
"Pt is GCS 15 laying comfortably on gurney with no apparent distress. Pt vitals rechecked. VSS.   BP (!) 162/107   Pulse 91   Temp 36.4 °C (97.5 °F) (Temporal)   Resp 16   Ht 1.803 m (5' 11\")   Wt 109 kg (240 lb)   SpO2 97%   BMI 33.47 kg/m²     "

## 2023-08-02 NOTE — ED NOTES
Pt resting in bed, appears to be sleeping with even rise and fall of chest noted. No obvious signs of pain or distress, sitter in hallway performing direct observation, reposition self occasionally, bed in low position, safety rooms features in place.

## 2023-08-02 NOTE — ED NOTES
PT denies want for meal tray at this time. Placed outside for when pt changes mind    1:1 continuous observation by designee maintained.

## 2023-08-02 NOTE — ED NOTES
Bedside report received from Hermelindo MATA.   Pt resting in [facility] with eyes closed, even respirations and stable vitals. Easily wakens to voice. Aox4. Denies wants at this time.     Report given to josemanuel. 1:1 observation by designee maintained.

## 2023-08-02 NOTE — ED NOTES
Report received from ADOLFO Fofana.  Pt resting in bed, appears to be sleeping with even rise and fall of chest noted. No obvious signs of pain or distress, sitter in hallway performing direct observation, reposition self occasionally, bed in low position, safety rooms features in place.

## 2023-08-02 NOTE — ED NOTES
Pt /107, ERP notified & made aware.   Pt resting on gurney, expressing NAD. Sitter 1:1 at direct view of pt

## 2023-08-02 NOTE — DISCHARGE PLANNING
Alert Team:    No Active Insurance plan. E-mail sent to Patient Financial Assistance dept. This AM by this writer requesting [place] Medicaid application screening.

## 2023-08-02 NOTE — DISCHARGE PLANNING
Alert Team Note:    Faxed updated referral to [facility] for consideration under a possible ABHISHEK as requested by Alert Team ADOLFO Sinha.

## 2023-08-02 NOTE — ED NOTES
Pt ambulate to bathroom acompanied by staff. Back to room, in [facility]. All needs met at this time. Sitter 1:1 in direct view

## 2023-08-02 NOTE — ED NOTES
Patient's home medications have been reviewed by the pharmacy team.     Past Medical History:   Diagnosis Date    Psychiatric disorder        Patient's Medications   New Prescriptions    No medications on file   Previous Medications    ACETAMINOPHEN (TYLENOL) 500 MG TAB    Take 500-1,000 mg by mouth every 6 hours as needed for Mild Pain.   Modified Medications    No medications on file   Discontinued Medications    ASPIRIN EC (ECOTRIN) 81 MG TABLET DELAYED RESPONSE    Take 1 Tablet by mouth every day.    METOPROLOL SR (TOPROL XL) 25 MG TABLET SR 24 HR    Take 0.5 Tablets by mouth every day.          A:  Medications do not appear to be contributing to current complaints.       P:    No recommendations at this time. Patient currently on no home medications. Awaiting psychiatry consult for further guidance      Jane Wells, PharmD, BCEMP
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
## 2023-08-02 NOTE — ED NOTES
Pt states would like to sleep. Continues to deny want for breakfast tray.     1:1 continuous observation by designee maintained.

## 2023-08-02 NOTE — ED NOTES
Assumed bedside report and patient care from ADOLFO Gonsalez. Safety measures in place, sitter 1:1 in direct view of pt.

## 2023-08-02 NOTE — ED NOTES
Rounded on patient, Patient is resting comfortably, asleep. Breathing steady with equal chest rise. 1:1 safety sitter outside of room for observation and safety.

## 2023-08-02 NOTE — ED NOTES
.Patient remains comfortable on gurney, no identifiable needs at this time. Equal chest rise and fall bilaterally Safety measures in place, sitter 1:1 in direct line of view of pt.

## 2023-08-02 NOTE — ED NOTES
Patient remains comfortable on gurney, no identifiable needs at this time. Equal chest rise and fall bilaterally. Safety measures in place, sitter at bedside 1:1 in direct view of pt

## 2023-08-02 NOTE — DISCHARGE PLANNING
[facility] ALERT TEAM DISCHARGE PLANNING NOTE    Date:  8/2/23  Patient Name:  Adria Castrejon 32 y.o. - Discharge Planning  MRN:  [id number]   YOB: 1990  ADMISSION DATE:  8/1/2023     Writer forwarded referral packet for inpatient psychiatric care to the following community providers:  [facility] via [facility]    Items included in the referral packet:   __x___Face Sheet   __x___Pages 1 and 2 of completed legal hold   __x___Alert Team/Psych Assessment   __x___H&P   __x___UDS   __x___Blood Alcohol   __x___Vital signs   __n/a___Pregnancy Test (if applicable)   __x___Medications List   _____Covid Screen

## 2023-08-02 NOTE — ED PROVIDER NOTES
ED Provider Note    Patient was seen and evaluated by the alert team, legal hold has been completed.  Apparently the patient continues to have suicidal ideation and has access to guns.

## 2023-08-02 NOTE — ED NOTES
Pt continues to rest in gurney with eyes closed and even respirations. No additional needs at this time.     1:1 continuous observation by designee maintained.

## 2023-08-02 NOTE — ED NOTES
.Patient remains comfortable on gurney, no identifiable needs at this time. Equal chest rise and fall bilaterally. Safety measures in place, sitter 1:1 in direct view of pt

## 2023-08-02 NOTE — ED NOTES
Pt medicated per MAR. Pt educated on medications. Identifiers used. Pt verbalized understanding. All needs met at this time.   Sitter 1:1 in direct view of pt.

## 2023-08-03 VITALS
HEART RATE: 83 BPM | BODY MASS INDEX: 33.6 KG/M2 | SYSTOLIC BLOOD PRESSURE: 141 MMHG | DIASTOLIC BLOOD PRESSURE: 91 MMHG | RESPIRATION RATE: 17 BRPM | HEIGHT: 71 IN | WEIGHT: 240 LBS | OXYGEN SATURATION: 98 % | TEMPERATURE: 97.5 F

## 2023-08-03 PROCEDURE — A9270 NON-COVERED ITEM OR SERVICE: HCPCS | Performed by: STUDENT IN AN ORGANIZED HEALTH CARE EDUCATION/TRAINING PROGRAM

## 2023-08-03 PROCEDURE — 700102 HCHG RX REV CODE 250 W/ 637 OVERRIDE(OP)

## 2023-08-03 PROCEDURE — A9270 NON-COVERED ITEM OR SERVICE: HCPCS

## 2023-08-03 PROCEDURE — 99283 EMERGENCY DEPT VISIT LOW MDM: CPT | Mod: GC | Performed by: PSYCHIATRY & NEUROLOGY

## 2023-08-03 PROCEDURE — 700102 HCHG RX REV CODE 250 W/ 637 OVERRIDE(OP): Performed by: STUDENT IN AN ORGANIZED HEALTH CARE EDUCATION/TRAINING PROGRAM

## 2023-08-03 RX ADMIN — FLUOXETINE 20 MG: 20 CAPSULE ORAL at 06:17

## 2023-08-03 RX ADMIN — FOLIC ACID 1 MG: 1 TABLET ORAL at 06:17

## 2023-08-03 RX ADMIN — THERA TABS 1 TABLET: TAB at 06:16

## 2023-08-03 RX ADMIN — Medication 100 MG: at 06:17

## 2023-08-03 ASSESSMENT — ENCOUNTER SYMPTOMS
HEADACHES: 0
MYALGIAS: 0
DIARRHEA: 0
SHORTNESS OF BREATH: 0
VOMITING: 0
DOUBLE VISION: 0

## 2023-08-03 ASSESSMENT — LIFESTYLE VARIABLES: SUBSTANCE_ABUSE: 1

## 2023-08-03 NOTE — ED NOTES
Bedside report to Danitza MATA, discussed orders, poc, patient status and fall risk precautions, removed self from care of patient.

## 2023-08-03 NOTE — ED NOTES
Bedside report received from Jose RN, assumed care of patient, 1:1 sitter at bedside, SI precautions in place, nad, visible chest rise and fall.

## 2023-08-03 NOTE — DISCHARGE PLANNING
Alert Team Note:    Contacted by [facility], spoke to Nhi. Pt has been accepted PENDING an ABHISHEK. Writer has sent ABHISHEK request to care management for approval.   Per Nhi, there is no bed availability at this time, but they are expecting discharges this morning.

## 2023-08-03 NOTE — CONSULTS
"PSYCHIATRIC FOLLOW-UP:(established)  *Reason for admission: SI       *Legal Hold Status: Extended           Chart reviewed.         HPI: Today patient reports he slept okay, reporting some noise from neighboring patients.  Patient denies nightmares.  Patient reports his appetite has been good, he has been eating more than usual.  Patient reports his mood is \"good\".  Patient reports that the guns have been removed from the home.  Patient denies suicidal ideation.  Patient notes the suicidal ideation was in the context of intoxication with alcohol.  Patient reports he has plans to go to  and the family  is involved.  Patient asked about discharge plan, patient was advised he is currently on the list for our .  If patient continues to be in the hospital, will reassess patient tomorrow for stability and assess the legal hold.  Patient denies side effects from Prozac medication that was started yesterday.             Medical ROS (as pertinent):     Review of Systems   Eyes:  Negative for double vision.   Respiratory:  Negative for shortness of breath.    Cardiovascular:  Negative for chest pain.   Gastrointestinal:  Negative for diarrhea and vomiting.   Musculoskeletal:  Negative for myalgias.   Neurological:  Negative for headaches.   Psychiatric/Behavioral:  Positive for substance abuse. Negative for suicidal ideas.            Psychiatric Examination:  Vitals:   Vitals:    08/03/23 0614   BP: 126/84   Pulse: 87   Resp: 18   Temp:    SpO2: 96%      Appearance: appears stated age, fair grooming and hygiene, calm, cooperative, good eye contact  Abnormal movements: none  Gait/posture: Sitting up in bed.  Speech: normal volume, tone and rhythm  Though process: linear and goal oriented  Associations: no loose associations  Thought content: not observed responding to internal stimuli, denies paranoia, delusional content not present during interview  Judgement and Insight: fair/fair  Orientation:oriented to person, " "place, time and situation  Recent and Remote Memory: intact  Attention Span and Concentration: intact  Language: English, fluent  Fund of Knowledge: not tested   Mood and Affect:\"good\", euthymic, appropriate   SI/HI:denies any active or passive SI/HI           EK23 - QTc 437   Brain Imaging: none  EEG:  none     Labs personally reviewed   No results found for this or any previous visit (from the past 24 hour(s)).    Current Facility-Administered Medications   Medication Dose Route Frequency Provider Last Rate Last Admin    FLUoxetine (PROzac) capsule 20 mg  20 mg Oral DAILY Gretchen Suarez M.D.   20 mg at 23 0617    thiamine (Vitamin B-1) tablet 100 mg  100 mg Oral DAILY Keenan Liang M.D.   100 mg at 23 0617    And    multivitamin tablet 1 Tablet  1 Tablet Oral DAILY Keenan Liang M.D.   1 Tablet at 23 0616    And    folic acid (Folvite) tablet 1 mg  1 mg Oral DAILY Keenan Liang M.D.   1 mg at 23 0617    LORazepam (Ativan) tablet 0.5 mg  0.5 mg Oral Q4HRS PRN Keenan Liang M.D.   0.5 mg at 23 1316    LORazepam (Ativan) tablet 1 mg  1 mg Oral Q4HRS PRN Keenan Liang M.D.   1 mg at 23 1941    Or    LORazepam (Ativan) injection 0.5 mg  0.5 mg Intravenous Q4HRS PRN Keenan Liang M.D.        LORazepam (Ativan) tablet 2 mg  2 mg Oral Q2HRS PRN Keenan Liang M.D.        Or    LORazepam (Ativan) injection 1 mg  1 mg Intravenous Q2HRS PRN Keenan Liang M.D.        LORazepam (Ativan) tablet 3 mg  3 mg Oral Q HOUR PRN Keenan Liang M.D.        Or    LORazepam (Ativan) injection 1.5 mg  1.5 mg Intravenous Q HOUR PRN Keenan Liang M.D.        LORazepam (Ativan) tablet 4 mg  4 mg Oral Q15 MIN PRN Keenan Liang M.D.        Or    LORazepam (Ativan) injection 2 mg  2 mg Intravenous Q15 MIN PRN Keenan Liang M.D.         Current Outpatient Medications   Medication Sig Dispense Refill    acetaminophen (TYLENOL) 500 MG Tab " Take 500-1,000 mg by mouth every 6 hours as needed for Mild Pain.           Assessment: Suicidal ideation appears to be in the context of alcohol intoxication.  Patient reports he plans to attend AA upon discharge.  Patient reports guns have been removed from the house, this has not yet been confirmed by mom.  Patient is pending list for inpatient psychiatric facility, if patient is still at the hospital tomorrow we will readdress legal hold and safe discharge plan.  Patient denied side effects from Prozac.  We will give patient another day on medication to monitor side effects before deciding if naltrexone and prazosin will be started.  Patient was given a safety plan, will collect and review with patient tomorrow.      Dx:  #Major depressive disorder, severe - current  #Alcohol use disorder, severe - current  #Acute alcohol withdrawal - current  #substance induced mood disorder  #PTSD     Medical :  #Alcohol withdrawal syndrome      Plan:  - Legal hold: Extended  - Psychotropic medications  Continue Prozac 20mg PO daily for depression, PTSD  Plan to start Naltrexone for alcohol cravings prior to discharge, will reassess tomorrow  Will consider Prazosin 1mg PO qhs for nightmares, not started today as team is monitoring for Prozac side effects.   - Please transfer pt to inpatient psychiatric hospital when medically cleared and bed is available  - Labs reviewed  - EKG reviewed  - Old records reviewed  - Safety plan given to patient, will collect tomorrow  - Discussed the case with: Dr. Odell and voalte message sent to Dr. Proctor  - Psychiatry will follow up    Thank you for the consult.     Sitter: 1:1  Phone: family and work, supervised. Mom is planning to bring patient cell phone. Please allow patient to make calls to work for time missed.   Visitors: mom, supervised  Personal belongings: no    This note was created using voice recognition software (Dragon). The accuracy of the dictation is limited by the  abilities of the software. I have reviewed the note prior to signing. However, error related to voice recognition software and /or scribes may still exist and should be interpreted within the appropriate context.

## 2023-08-03 NOTE — ED NOTES
Per Alert team RN, pt has been accepted to [facility] and will be transported at 1230

## 2023-08-03 NOTE — ED NOTES
Pt asleep resting comfortably, respirations are regular and unlabored. 1:1 sitter remains at bedside in direct view of pt for close observation

## 2023-08-03 NOTE — ED NOTES
Bedside report received from ADOLFO Cervantes. Assumed care of pt. Pt resting in gurney, respirations even and unlabored.   Sitter in direct view of pt.

## 2023-08-03 NOTE — ED NOTES
Pt transferred to [facility] with EMS. All pt belongings and transfer packet with legal hold paperwork given to EMS. Pt vitals and condition stable for transport.

## 2023-08-03 NOTE — ED NOTES
Provide pt with phone to call his mom to arrange notifying his job that he will not be at work due to hospitaliization, phone call supervised by this RN

## 2023-08-03 NOTE — ED NOTES
Patient appears to be sleeping at this time, nad, visible chest rise and fall, 1:1 sitter at bedside.

## 2023-08-03 NOTE — ED NOTES
Pt refused breakfast at this time. Pt resting comfortably, 1:1 sitter remains at bedside in direct view of pt for close observation

## 2023-08-03 NOTE — ED NOTES
Psychiatry team at bedside to assess. Pt's sister Cherelle dropped off pt's phone, this RN secured phone in locker for safekeeping

## 2023-08-03 NOTE — DISCHARGE PLANNING
Alert Team Note:    Received approved ABHISHEK from [employer] [employer] Faxed to [facility] for their signature.

## 2023-08-03 NOTE — DISCHARGE PLANNING
Alert Team Note:    Contacted [facility], spoke to Nhi. Pt referral has been received and is being reviewed. Referral to be staffed with doctor.

## 2023-08-03 NOTE — DISCHARGE PLANNING
Alert Team Note:    Contacted by [facility], spoke to Nhi. Pt has been accepted, accepting is Dr. Etienne. Facility expects transport at 1230.  Informed LH SARAH Botello.

## 2023-08-03 NOTE — ED NOTES
Assumed care of pt, received bedside report from ADOLFO Bosch. Safety rounds completed, pt resting comfortably in the room. Verified that Legal Hold appropriate and signed in patient's chart. Pt wearing hospital gown without ties. Pt educated about legal hold procedures and pt verbalized understanding. 1:1 sitter continued per protocol for close monitoring. Continuous visual monitoring by Trained Personnel. Sitter has unobstructed view of patient at all times. Discussion with sitter about patient care, safety and support.

## 2023-08-03 NOTE — ED NOTES
IV removed, pt had a supervised call to his mom with this RN to update her about POC and transfer to [facility]. 1:1 sitter remains at bedside in direct view of pt for close observation

## 2023-08-03 NOTE — ED NOTES
VS taken. Pt medicated per MAR, education provided, pt verbalized understanding.    Sitter in direct view of pt.

## 2023-08-03 NOTE — DISCHARGE SUMMARY
"  ED Observation Discharge Summary    Patient:Adria Chacon  Patient : 1990  Patient MRN: [id number]  Patient PCP: Pcp Pt States None    Admit Date: 2023  Discharge Date and Time: 23 11:02 AM  Discharge Diagnosis:   1. Suicidal ideation    2. Alcoholic intoxication without complication (HCC)    3. Alcohol withdrawal syndrome with complication (HCC)        Discharge Attending: Roland Proctor M.D.  Discharge Service: ED Observation    ED Course  Adria is a 32 y.o. male who was evaluated at [facility] emergency department.  Patient was evaluated by the behavioral health team.  [place] to be appropriate for inpatient psychiatric treatment.  Patient has done well here.  Has now been accepted at [facility] and will be transferred at 12:30 PM today.    Discharge Exam:  /84   Pulse 87   Temp 36.3 °C (97.4 °F) (Temporal)   Resp 18   Ht 1.803 m (5' 11\")   Wt 109 kg (240 lb)   SpO2 96%   BMI 33.47 kg/m² .    Constitutional: Awake and alert. Nontoxic  HENT:  Grossly normal  Eyes: Grossly normal  Neck: Normal range of motion  Cardiovascular: Normal heart rate   Thorax & Lungs: No respiratory distress  Abdomen: Nontender  Skin:  No pathologic rash.   Extremities: Well perfused  Psychiatric: Depressed mood.  Flat affect.  Labs  Results for orders placed or performed during the hospital encounter of 23   Urine Drug Screen   Result Value Ref Range    Amphetamines Urine Negative Negative    Barbiturates Negative Negative    Benzodiazepines Negative Negative    Cocaine Metabolite Negative Negative    Fentanyl, Urine Negative Negative    Methadone Negative Negative    Opiates Negative Negative    Oxycodone Negative Negative    Phencyclidine -Pcp Negative Negative    Propoxyphene Negative Negative    Cannabinoid Metab Negative Negative   Salicylate   Result Value Ref Range    Salicylates, Quant. <1.0 (L) 15.0 - 25.0 mg/dL   ACETAMINOPHEN   Result Value Ref Range    Acetaminophen " -Tylenol <5.0 (L) 10.0 - 30.0 ug/mL   Basic Metabolic Panel   Result Value Ref Range    Sodium 144 135 - 145 mmol/L    Potassium 4.4 3.6 - 5.5 mmol/L    Chloride 104 96 - 112 mmol/L    Co2 20 20 - 33 mmol/L    Glucose 91 65 - 99 mg/dL    Bun 12 8 - 22 mg/dL    Creatinine 0.90 0.50 - 1.40 mg/dL    Calcium 10.4 8.5 - 10.5 mg/dL    Anion Gap 20.0 (H) 7.0 - 16.0   ESTIMATED GFR   Result Value Ref Range    GFR (CKD-EPI) 114 >60 mL/min/1.73 m 2   POC BREATHALIZER   Result Value Ref Range    POC Breathalizer 0.216 (A) 0.00 - 0.01 Percent   POC BREATHALIZER   Result Value Ref Range    POC Breathalizer 0.150 (A) 0.00 - 0.01 Percent   EKG (Now)   Result Value Ref Range    Report       [facility] Emergency Dept.    Test Date:  2023  Pt Name:    KELY CHRISTIAN                   Department: ER  MRN:        [id number]                      Room:       [facility]  Gender:     Male                         Technician: [id number]  :        1990                   Requested By:VERONICA LIANG  Order #:    [id number]                    Reading MD: Veronica Liang    Measurements  Intervals                                Axis  Rate:       98                           P:          47  IA:         141                          QRS:        -36  QRSD:       96                           T:          32  QT:         342  QTc:        437    Interpretive Statements  EKG interpretation: Normal sinus rhythm at a rate of 98, normal axis, normal  intervals, no ST elevation or depression, T wave flattening in lead III.  No  change from prior EKG.  Electronically Signed On 2023 05:08:04 PDT by Veronica Liang         Radiology  No orders to display       Medications:   New Prescriptions    No medications on file       My final assessment includes     1. Suicidal ideation    2. Alcoholic intoxication without complication (HCC)    3. Alcohol withdrawal syndrome with complication (HCC)        Upon Reevaluation, the patient's condition  has: Improved; and will be discharged.    Patient discharged from ED Observation status at 11:03 AM  (Time) 8/3/2023  (Date).     Total time spent on this ED Observation discharge encounter is < 30 Minutes    Electronically signed by: Roland Proctor M.D., 8/3/2023 11:02 AM

## 2023-08-03 NOTE — DISCHARGE PLANNING
Legal Hold Transfer     Referral: Legal hold transfer to Mental Health Facility     Intervention: Notified by  Ines that pt has been accepted to [facility].     Pt's accepting physcian is Dr. Sarah     Transport arranged through [facility] at [facility]     The pt will be picked up at 1230      Notified Bedside RN Zuleika, Alert Team ADOLFO Flores, and Dr. Proctor of the departure time as well as accepting facility.      Transfer packet and COBRA created with original legal hold and placed on chart.      Plan: Pt will be transferring to [facility] today at 1230 via [facility].